# Patient Record
Sex: MALE | Race: ASIAN | Employment: STUDENT | URBAN - METROPOLITAN AREA
[De-identification: names, ages, dates, MRNs, and addresses within clinical notes are randomized per-mention and may not be internally consistent; named-entity substitution may affect disease eponyms.]

---
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
## 2023-08-07 ENCOUNTER — TELEPHONE (OUTPATIENT)
Dept: INTERNAL MEDICINE CLINIC | Facility: CLINIC | Age: 18
End: 2023-08-07

## 2023-08-07 DIAGNOSIS — E66.09 CLASS 2 OBESITY DUE TO EXCESS CALORIES WITHOUT SERIOUS COMORBIDITY WITH BODY MASS INDEX (BMI) OF 36.0 TO 36.9 IN ADULT: Primary | ICD-10-CM

## 2023-08-07 NOTE — TELEPHONE ENCOUNTER
Insurance: [employer] ihp hmo   Provider's Name?: aguila erickson   Provider's Specialty?: internal medicine weight loss management    Reason for Visit?: consult  Diagnosis?: obsesity   Number of Visits Requested?: 3    Last Visit with Specialist?: n/a  Is Appt.  Already Scheduled?: n/a        If so, Date?:  n/a  Once referral is approved pt can see referral via [employer] [facility]

## 2023-08-14 ENCOUNTER — OFFICE VISIT (OUTPATIENT)
Dept: INTERNAL MEDICINE CLINIC | Facility: CLINIC | Age: 18
End: 2023-08-14
Payer: COMMERCIAL

## 2023-08-14 VITALS
DIASTOLIC BLOOD PRESSURE: 88 MMHG | HEART RATE: 64 BPM | HEIGHT: 73.5 IN | WEIGHT: 290.88 LBS | TEMPERATURE: 97 F | SYSTOLIC BLOOD PRESSURE: 128 MMHG | BODY MASS INDEX: 37.73 KG/M2

## 2023-08-14 DIAGNOSIS — Z00.00 PHYSICAL EXAM, ANNUAL: Primary | ICD-10-CM

## 2023-08-14 PROCEDURE — 3074F SYST BP LT 130 MM HG: CPT | Performed by: INTERNAL MEDICINE

## 2023-08-14 PROCEDURE — 3079F DIAST BP 80-89 MM HG: CPT | Performed by: INTERNAL MEDICINE

## 2023-08-14 PROCEDURE — 99395 PREV VISIT EST AGE 18-39: CPT | Performed by: INTERNAL MEDICINE

## 2023-08-14 PROCEDURE — 3008F BODY MASS INDEX DOCD: CPT | Performed by: INTERNAL MEDICINE

## 2023-08-18 ENCOUNTER — LAB ENCOUNTER (OUTPATIENT)
Dept: LAB | Age: 18
End: 2023-08-18
Attending: INTERNAL MEDICINE
Payer: COMMERCIAL

## 2023-08-18 DIAGNOSIS — Z00.00 PHYSICAL EXAM, ANNUAL: ICD-10-CM

## 2023-08-18 LAB
ALBUMIN SERPL-MCNC: 4.3 G/DL (ref 3.4–5)
ALBUMIN/GLOB SERPL: 1.2 {RATIO} (ref 1–2)
ALP LIVER SERPL-CCNC: 66 U/L
ALT SERPL-CCNC: 130 U/L
ANION GAP SERPL CALC-SCNC: 5 MMOL/L (ref 0–18)
AST SERPL-CCNC: 36 U/L (ref 15–37)
BASOPHILS # BLD AUTO: 0.06 X10(3) UL (ref 0–0.2)
BASOPHILS NFR BLD AUTO: 0.6 %
BILIRUB SERPL-MCNC: 0.8 MG/DL (ref 0.1–2)
BUN BLD-MCNC: 16 MG/DL (ref 7–18)
CALCIUM BLD-MCNC: 9.5 MG/DL (ref 8.5–10.1)
CHLORIDE SERPL-SCNC: 106 MMOL/L (ref 98–112)
CHOLEST SERPL-MCNC: 195 MG/DL (ref ?–200)
CO2 SERPL-SCNC: 25 MMOL/L (ref 21–32)
CREAT BLD-MCNC: 1.1 MG/DL
EGFRCR SERPLBLD CKD-EPI 2021: 99 ML/MIN/1.73M2 (ref 60–?)
EOSINOPHIL # BLD AUTO: 0.25 X10(3) UL (ref 0–0.7)
EOSINOPHIL NFR BLD AUTO: 2.3 %
ERYTHROCYTE [DISTWIDTH] IN BLOOD BY AUTOMATED COUNT: 12.7 %
FASTING PATIENT LIPID ANSWER: YES
FASTING STATUS PATIENT QL REPORTED: YES
GLOBULIN PLAS-MCNC: 3.5 G/DL (ref 2.8–4.4)
GLUCOSE BLD-MCNC: 97 MG/DL (ref 70–99)
HCT VFR BLD AUTO: 49.5 %
HDLC SERPL-MCNC: 39 MG/DL (ref 40–59)
HGB BLD-MCNC: 16.8 G/DL
IMM GRANULOCYTES # BLD AUTO: 0.03 X10(3) UL (ref 0–1)
IMM GRANULOCYTES NFR BLD: 0.3 %
LDLC SERPL CALC-MCNC: 104 MG/DL (ref ?–100)
LYMPHOCYTES # BLD AUTO: 3.01 X10(3) UL (ref 1–4)
LYMPHOCYTES NFR BLD AUTO: 27.8 %
MCH RBC QN AUTO: 27.5 PG (ref 26–34)
MCHC RBC AUTO-ENTMCNC: 33.9 G/DL (ref 31–37)
MCV RBC AUTO: 81 FL
MONOCYTES # BLD AUTO: 0.86 X10(3) UL (ref 0.1–1)
MONOCYTES NFR BLD AUTO: 7.9 %
NEUTROPHILS # BLD AUTO: 6.62 X10 (3) UL (ref 1.5–7.7)
NEUTROPHILS # BLD AUTO: 6.62 X10(3) UL (ref 1.5–7.7)
NEUTROPHILS NFR BLD AUTO: 61.1 %
NONHDLC SERPL-MCNC: 156 MG/DL (ref ?–130)
OSMOLALITY SERPL CALC.SUM OF ELEC: 283 MOSM/KG (ref 275–295)
PLATELET # BLD AUTO: 274 10(3)UL (ref 150–450)
POTASSIUM SERPL-SCNC: 3.8 MMOL/L (ref 3.5–5.1)
PROT SERPL-MCNC: 7.8 G/DL (ref 6.4–8.2)
RBC # BLD AUTO: 6.11 X10(6)UL
SODIUM SERPL-SCNC: 136 MMOL/L (ref 136–145)
TRIGL SERPL-MCNC: 303 MG/DL (ref 30–149)
TSI SER-ACNC: 3.53 MIU/ML (ref 0.36–3.74)
VLDLC SERPL CALC-MCNC: 52 MG/DL (ref 0–30)
WBC # BLD AUTO: 10.8 X10(3) UL (ref 4–11)

## 2023-08-18 PROCEDURE — 84443 ASSAY THYROID STIM HORMONE: CPT

## 2023-08-18 PROCEDURE — 80061 LIPID PANEL: CPT

## 2023-08-18 PROCEDURE — 36415 COLL VENOUS BLD VENIPUNCTURE: CPT

## 2023-08-18 PROCEDURE — 80053 COMPREHEN METABOLIC PANEL: CPT

## 2023-08-18 PROCEDURE — 85025 COMPLETE CBC W/AUTO DIFF WBC: CPT

## 2024-05-08 ENCOUNTER — TELEPHONE (OUTPATIENT)
Dept: INTERNAL MEDICINE CLINIC | Facility: CLINIC | Age: 19
End: 2024-05-08

## 2024-05-08 DIAGNOSIS — R73.03 PREDIABETES: ICD-10-CM

## 2024-05-08 DIAGNOSIS — E66.09 CLASS 2 OBESITY DUE TO EXCESS CALORIES WITHOUT SERIOUS COMORBIDITY WITH BODY MASS INDEX (BMI) OF 36.0 TO 36.9 IN ADULT: Primary | ICD-10-CM

## 2024-05-08 NOTE — TELEPHONE ENCOUNTER
[facility]  Provider's Name?: Dr Chawla   Provider's Specialty?: Diabetes    Reason for Visit?: Diabetes   Diagnosis?: N/A   Number of Visits Requested?: 6    Last Visit with Specialist?: 3/13/24   Is Appt. Already Scheduled?: Yes        If so, Date?:  5/15/24  Once referral is approved pt can see referral via [employer]

## 2024-05-08 NOTE — TELEPHONE ENCOUNTER
This provider is techically an internal medicine provider even though he is only managing pt's obesity/prediabetes. Cannot refer to another internal medicine provider. Cannot place an external weight loss referral, only internal available. Only option is to place an external endocrine referral and see if this can be processed. Referral placed, will monitor for auth.

## 2024-05-13 NOTE — TELEPHONE ENCOUNTER
Spoke to patient. Patient wants to go to weight loss clinic as exercising and not technically T2DM. Referral placed. Mcm sent with info per patient request.

## 2024-05-13 NOTE — TELEPHONE ENCOUNTER
See LIYAH Vazquez previous note below.    Per referrals dept:    \"Mary Conner GABRIELA  [facility] Clinical Staff  Hello  The referred to provider Dr. Elise Chawla, is not in network [facility].    This request has been closed.  Please see listed below in network Endocrinologists.  A new referral for any of these providers can be placed.  Thank you  Mary    Name:  DYLAN DE LA ROSA MD  Office:  [facility]  ENDOCRINOLOGY  [address]  [address]  [place], [place] [id number]  Phone:  [phone]  Fax:  [phone]    Name:  ALICJA MICHEL MD  Office:  [facility]  ENDOCRINOLOGY  [address] [place] [address]  Phone:  [phone]  Fax:  [phone]    Name:  QAMAR VALDEZ MD  Office:  [facility]  ENDOCRINOLOGY  [address] [place] [id number]  Phone:  [phone]  Fax:  [phone]    Name:  FRANCE BARKER MD  Office:  [facility]  ENDOCRINOLOGY  [address]  [place], [place] [address]  Phone:  [phone]  Fax:  [phone]    Name:  FRANCE BARKER MD  Office:  [facility]  ENDOCRINOLOGY  [address]  [place], [place] [address]  Phone:  [phone]  Fax:  [phone]    Name:  ESTEVAN SUNSHINE MD  Office:  [facility]  ENDOCRINOLOGY  [address] [place] [address]  Phone:  [phone]  Fax:  [phone]    Name:  JORGE LAYTON MD  Office:  [facility]  ENDOCRINOLOGY  [address] [place] [id number]  Phone:  [phone]  Fax:  [phone]    Name:  DYLAN DE LA ROSA MD  Office:  [facility]  ENDOCRINOLOGY  [address]  [address]  [place], [place] [id number]  Phone:  [phone]  Fax:  [phone]    Name:  JORGE LAYTON MD  Office:  [facility]  ENDOCRINOLOGY  [address]  [address]  [place], [place]  [id number]  Phone:  [phone]  Fax:  [phone]    Name:  QAMAR VALDEZ MD  Office:  [facility]  ENDOCRINOLOGY  [address]  [address]  [place] [address]  Phone:  [phone]  Fax:  [phone]    Name:  QAMAR VALDEZ MD  Office:  [facility]  ENDOCRINOLOGY  [address]  [address]  [place] [address]  Phone:  [phone]  Fax:  [phone]    Name:  LYLA DELVALLE MD  Office:  [facility]  ENDOCRINOLOGY  [address]  [place] [address]  Phone:  [phone]  Fax:  [phone]    Name:  KATARINA RAMOS DO  Office:  [facility]  ENDOCRINOLOGY  [address]  [address]  [place], [place] [id number]  Phone:  [phone]  Fax:  [phone]    Name:  ALICJA MICHEL MD  Office:  [facility]  ENDOCRINOLOGY  [address]  [address]  [place], [place] [address]  Phone:  [phone]  Fax:  [phone]    Name:  PRISCILA SMART DO  Office:  [facility]  ENDOCRINOLOGY  [address]  [address]  [place], [place] [id number]  Phone:  [phone]  Fax:  [phone]    .Name:  BEVERLY MIDDLETON MD  Office:  [facility]  ENDOCRINOLOGY  [address]  [address]  [place] [address]  Phone:  [phone]  Fax:  [phone]    Please advise- thanks!

## 2024-05-13 NOTE — TELEPHONE ENCOUNTER
Pt is ihp so may not be approved for this. Sounds like he sees them for weight loss. We can either refer him to our weight loss clinic here or to endocrine here, he can see dr. White or dr. Menard.   Please check with pt and redo the referral.

## 2024-06-14 ENCOUNTER — OFFICE VISIT (OUTPATIENT)
Dept: FAMILY MEDICINE CLINIC | Facility: CLINIC | Age: 19
End: 2024-06-14
Payer: COMMERCIAL

## 2024-06-14 VITALS
SYSTOLIC BLOOD PRESSURE: 130 MMHG | OXYGEN SATURATION: 97 % | TEMPERATURE: 98 F | RESPIRATION RATE: 16 BRPM | HEART RATE: 89 BPM | WEIGHT: 193 LBS | DIASTOLIC BLOOD PRESSURE: 68 MMHG | BODY MASS INDEX: 25 KG/M2

## 2024-06-14 DIAGNOSIS — H60.502 ACUTE OTITIS EXTERNA OF LEFT EAR, UNSPECIFIED TYPE: Primary | ICD-10-CM

## 2024-06-14 PROCEDURE — 3078F DIAST BP <80 MM HG: CPT | Performed by: NURSE PRACTITIONER

## 2024-06-14 PROCEDURE — 3075F SYST BP GE 130 - 139MM HG: CPT | Performed by: NURSE PRACTITIONER

## 2024-06-14 PROCEDURE — 99213 OFFICE O/P EST LOW 20 MIN: CPT | Performed by: NURSE PRACTITIONER

## 2024-06-14 RX ORDER — OFLOXACIN 3 MG/ML
10 SOLUTION AURICULAR (OTIC) DAILY
Qty: 5 ML | Refills: 0 | Status: SHIPPED | OUTPATIENT
Start: 2024-06-14 | End: 2024-06-21

## 2024-06-14 NOTE — PROGRESS NOTES
CHIEF COMPLAINT:     Chief Complaint   Patient presents with    Ear Problem     ear infection, hair strand removed from ear few days ago. Ear continues to swole. - Entered by patient Left ear x 3 days balance is off       HPI:   Dexter Yoder is a 18 year old male who presents to clinic today with complaints of left ear pain. Has had for 3 days. Reports ear swollen. Removed hair and ear wax with tool. Home treatment includes none.   Denies ear drainage. Patient denies use of Q-tips to clean the ears.     Current Outpatient Medications   Medication Sig Dispense Refill    ofloxacin 0.3 % Otic Solution Place 10 drops into the left ear daily for 7 days. 5 mL 0    metFORMIN  MG Oral Tablet 24 Hr Take 1 tablet (500 mg total) by mouth daily with breakfast.        Past Medical History:    Boxer's fracture      Social History:  Social History     Socioeconomic History    Marital status: Single   Tobacco Use    Smoking status: Never    Smokeless tobacco: Never   Vaping Use    Vaping status: Never Used   Substance and Sexual Activity    Alcohol use: Never    Drug use: Never    Sexual activity: Never        REVIEW OF SYSTEMS:   GENERAL: Feeling well otherwise.    SKIN: no unusual skin lesions or rashes  HEENT: See HPI.  Denies tinnitus or sinus congestion.  LUNGS: No cough, shortness of breath, or wheezing.  CARDIOVASCULAR: No chest pain, palpitations  GI: No N/V/C/D.  NEURO: denies headaches or dizziness    EXAM:   /68   Pulse 89   Temp 97.9 °F (36.6 °C) (Oral)   Resp 16   Wt 193 lb (87.5 kg)   SpO2 97%   BMI 25.12 kg/m²   GENERAL: well developed, well nourished,in no apparent distress  SKIN: no rashes,no suspicious lesions  HEAD: atraumatic, normocephalic  EYES: conjunctiva clear, EOM intact  EARS: Bilateral hearing is intact.  no tragus tender on palpation; left external auditory canal with erythema, no drainage, and mild swelling.  right external auditory canal healthy. Bilateral TMs: clear gray, no  bulging, no retraction, no effusion, bony landmarks visualized.  No  mastoid tenderness bilaterally.   NOSE: nostrils patent, nasal mucosa pink and noninflamed  THROAT: oral mucosa pink, moist. Posterior pharynx is not erythematous or injected. No exudates.  NECK: supple  LUNGS: clear to auscultation bilaterally, no wheezes or rhonchi. Breathing is non labored.  CARDIO: RRR without murmur  EXTREMITIES: no cyanosis, clubbing or edema  LYMPH: no tender auricular or cervical lymphadenopathy.      ASSESSMENT AND PLAN:   Dexter Yoder is a 18 year old male who presents with:    ASSESSMENT:  Encounter Diagnosis   Name Primary?    Acute otitis externa of left ear, unspecified type Yes     1. Acute otitis externa of left ear, unspecified type  - ofloxacin 0.3 % Otic Solution; Place 10 drops into the left ear daily for 7 days.  Dispense: 5 mL; Refill: 0    PLAN: Meds and instructions as listed below.    Keep ear dry for a week. Comfort measures as described in Patient Instructions  Risks, benefits, and side effects of medication explained and discussed.  Stressed importance of completing full course of antibiotic ear drops.     Tylenol/Motrin prn pain.    F/u with PCP in a week or sooner if symptoms worsen    Meds & Refills for this Visit:  Requested Prescriptions     Signed Prescriptions Disp Refills    ofloxacin 0.3 % Otic Solution 5 mL 0     Sig: Place 10 drops into the left ear daily for 7 days.     See pt handout      Patient voiced understand and is in agreement with treatment plan.

## 2024-06-21 ENCOUNTER — OFFICE VISIT (OUTPATIENT)
Dept: INTERNAL MEDICINE CLINIC | Facility: CLINIC | Age: 19
End: 2024-06-21

## 2024-06-21 VITALS
RESPIRATION RATE: 12 BRPM | HEIGHT: 73.5 IN | WEIGHT: 291.31 LBS | TEMPERATURE: 97 F | BODY MASS INDEX: 37.79 KG/M2 | SYSTOLIC BLOOD PRESSURE: 122 MMHG | DIASTOLIC BLOOD PRESSURE: 78 MMHG | HEART RATE: 64 BPM

## 2024-06-21 DIAGNOSIS — Z00.00 PHYSICAL EXAM, ANNUAL: ICD-10-CM

## 2024-06-21 DIAGNOSIS — E66.09 CLASS 2 OBESITY DUE TO EXCESS CALORIES WITHOUT SERIOUS COMORBIDITY WITH BODY MASS INDEX (BMI) OF 36.0 TO 36.9 IN ADULT: ICD-10-CM

## 2024-06-21 DIAGNOSIS — L20.82 FLEXURAL ECZEMA: ICD-10-CM

## 2024-06-21 DIAGNOSIS — Z01.84 IMMUNITY STATUS TESTING: ICD-10-CM

## 2024-06-21 DIAGNOSIS — H91.92 HEARING DIFFICULTY OF LEFT EAR: ICD-10-CM

## 2024-06-21 RX ORDER — METHYLPREDNISOLONE 4 MG/1
TABLET ORAL
Qty: 1 EACH | Refills: 0 | Status: SHIPPED | OUTPATIENT
Start: 2024-06-21

## 2024-06-21 RX ORDER — TRIAMCINOLONE ACETONIDE 1 MG/G
CREAM TOPICAL 2 TIMES DAILY PRN
Qty: 60 G | Refills: 0 | Status: SHIPPED | OUTPATIENT
Start: 2024-06-21

## 2024-06-21 RX ORDER — METFORMIN HYDROCHLORIDE 500 MG/1
500 TABLET, EXTENDED RELEASE ORAL 2 TIMES DAILY WITH MEALS
Qty: 180 TABLET | Refills: 1 | Status: SHIPPED | OUTPATIENT
Start: 2024-06-21

## 2024-06-21 NOTE — PROGRESS NOTES
[facility]    CHIEF COMPLAINT:    Chief Complaint   Patient presents with    Derm Problem     Rash on right lower and upper arm. X 1 month         HISTORY OF PRESENT ILLNESS:  here with several concerns.   Has a rash on both arms on the inside of his elbows and in the axillary area. Itchy. Has had it for over a month. No pain, no drainage. He has been scratching.   Has some around his mouth as well but not as itchy.   He does like to take long hot showers.     He was in the urgent care last week for left ear symptoms. Had decreased and muffled hearing. He was diagnosed with a left otitis externa and was given antibiotic ear drops. His symptoms seem to be coming back.   He feels he has wax in his ears. Uses a curette at times to clean it out. No pain in the ear.     Obesity: he was seeing a weight loss clinic and saw them in March but now needs a referral to an in network weight loss clinic. Currently on metformin.   He has elevated insulin and c peptide levels. Tsh was normal.   Exercises regularly. Goes to a gym.   Has been trying to watch the carbs in his diet.     Mother is a hep b carrier. Wants to get tested for hep b immunity. He had vaccines as a baby.     Will be due for cpx in a month or two.     REVIEW OF SYSTEMS:  See HPI    Current Medications:    Current Outpatient Medications   Medication Sig Dispense Refill    metFORMIN  MG Oral Tablet 24 Hr Take 1 tablet (500 mg total) by mouth 2 (two) times daily with meals. 180 tablet 1    methylPREDNISolone (MEDROL) 4 MG Oral Tablet Therapy Pack As directed. 1 each 0    triamcinolone 0.1 % External Cream Apply topically 2 (two) times daily as needed. 60 g 0    ofloxacin 0.3 % Otic Solution Place 10 drops into the left ear daily for 7 days. 5 mL 0       PAST MEDICAL, SOCIAL, AND FAMILY HISTORY:  Tobacco:    History   Smoking Status    Never   Smokeless Tobacco    Never       PHYSICAL EXAM:  /78 (BP Location: Left arm, Patient Position:  Sitting, Cuff Size: large)   Pulse 64   Temp 97.4 °F (36.3 °C) (Temporal)   Resp 12   Ht 6' 1.5\" (1.867 m)   Wt 291 lb 4.8 oz (132.1 kg)   BMI 37.91 kg/m²    GENERAL: well developed, well nourished,in no apparent distress  SKIN: patchy eczematous rash on his antecubital fossa, axillary fold and some around his mouth. No signs of infection.   HEENT: TMs normal bilaterally. Left ear canal with flaky dry skin, no signs of infection today.     DATA:  Results for orders placed or performed in visit on 08/18/23   Comp Metabolic Panel   Result Value Ref Range    Glucose 97 70 - 99 mg/dL    Sodium 136 136 - 145 mmol/L    Potassium 3.8 3.5 - 5.1 mmol/L    Chloride 106 98 - 112 mmol/L    CO2 25.0 21.0 - 32.0 mmol/L    Anion Gap 5 0 - 18 mmol/L    BUN 16 7 - 18 mg/dL    Creatinine 1.10 Male 0.70-1.30 : Female 0.55-1.02 mg/dL    Calcium, Total 9.5 8.5 - 10.1 mg/dL    Calculated Osmolality 283 275 - 295 mOsm/kg    eGFR-Cr 99 >=60 mL/min/1.73m2    AST 36 15 - 37 U/L     Male 16-61 : Female 13-56 U/L    Alkaline Phosphatase 66 Male  : Female  U/L    Bilirubin, Total 0.8 0.1 - 2.0 mg/dL    Total Protein 7.8 6.4 - 8.2 g/dL    Albumin 4.3 3.4 - 5.0 g/dL    Globulin  3.5 2.8 - 4.4 g/dL    A/G Ratio 1.2 1.0 - 2.0    Patient Fasting for CMP? Yes    Lipid Panel   Result Value Ref Range    Cholesterol, Total 195 <200 mg/dL    HDL Cholesterol 39 (L) 40 - 59 mg/dL    Triglycerides 303 (H) 30 - 149 mg/dL    LDL Cholesterol 104 (H) <100 mg/dL    VLDL 52 (H) 0 - 30 mg/dL    Non HDL Chol 156 (H) <130 mg/dL    Patient Fasting for Lipid? Yes    TSH W Reflex To Free T4   Result Value Ref Range    TSH 3.530 0.358 - 3.740 mIU/mL   CBC W/ DIFFERENTIAL   Result Value Ref Range    WBC 10.8 4.0 - 11.0 x10(3) uL    RBC 6.11 Male 4.30-5.70 : Female 3.80-5.30 x10(6)uL    HGB 16.8 Male 13.0-17.5 : Female 12.0-16.0 g/dL    HCT 49.5 Male 39.0-53.0 : Female 35.0-48.0 %    .0 150.0 - 450.0 10(3)uL    MCV 81.0 Male 80.0-100.0 :  Female 80.0-100.0 fL    MCH 27.5 26.0 - 34.0 pg    MCHC 33.9 31.0 - 37.0 g/dL    RDW 12.7 %    Neutrophil Absolute Prelim 6.62 1.50 - 7.70 x10 (3) uL    Neutrophil Absolute 6.62 1.50 - 7.70 x10(3) uL    Lymphocyte Absolute 3.01 1.00 - 4.00 x10(3) uL    Monocyte Absolute 0.86 0.10 - 1.00 x10(3) uL    Eosinophil Absolute 0.25 0.00 - 0.70 x10(3) uL    Basophil Absolute 0.06 0.00 - 0.20 x10(3) uL    Immature Granulocyte Absolute 0.03 0.00 - 1.00 x10(3) uL    Neutrophil % 61.1 %    Lymphocyte % 27.8 %    Monocyte % 7.9 %    Eosinophil % 2.3 %    Basophil % 0.6 %    Immature Granulocyte % 0.3 %            ASSESSMENT AND PLAN:  1. Flexural eczema  - methylPREDNISolone (MEDROL) 4 MG Oral Tablet Therapy Pack; As directed.  Dispense: 1 each; Refill: 0  - triamcinolone 0.1 % External Cream; Apply topically 2 (two) times daily as needed.  Dispense: 60 g; Refill: 0  Derm referral if no relief.   Counseling today re conservative management of eczema. Regular moisturization, avoidance of long hot showers.     2. Hearing difficulty of left ear  No ear infection today.   May be sec to etd but no mucus behind the tm today.   Trial claritin 10mg daily for 7-10 days to see if he has relief.   Counseling today re avoidance of curette or q tips in his ears. Okay to use gentle qtip on the outside if has itching but avoid excessive scratching.   See ent. May need steroid drops for his ears if persists.   - ENT - INTERNAL    3. Class 2 obesity due to excess calories without serious comorbidity with body mass index (BMI) of 36.0 to 36.9 in adult  Continue metformin. Increase it to 500mg bid.   See the weight loss clinic here, should be in network.   Has an appt in December. He will get on the wait list to be seen sooner. Is in college so will go away after the summer and will only be back in December.   Discussed diet, exercise.  - metFORMIN  MG Oral Tablet 24 Hr; Take 1 tablet (500 mg total) by mouth 2 (two) times daily with meals.   Dispense: 180 tablet; Refill: 1  - [facility] - Richa Mercado APRN [address] [place] [address], [address]    4. Immunity status testing  Will check hep b immunity.   - Hepatitis B Surface Antibody [E]; Future    5. Physical exam, annual  Labs prior to cpx.   - CBC With Differential With Platelet; Future  - Comp Metabolic Panel; Future  - Lipid Panel; Future        Return in about 6 years (around 6/21/2030) for physical.    50 minutes spent on provision of medical services today, including chart review, obtaining and reviewing history, performing medically appropriate examination, counseling and educating patient and/or caregiver, ordering testing , medications, referrals or procedures, my independent interpretation of results, communication of results to patient and /or caregiver, care coordination, and documentation of all of the above.       Justina Suarez MD

## 2024-08-16 ENCOUNTER — OFFICE VISIT (OUTPATIENT)
Dept: INTERNAL MEDICINE CLINIC | Facility: CLINIC | Age: 19
End: 2024-08-16
Payer: COMMERCIAL

## 2024-08-16 VITALS
TEMPERATURE: 97 F | WEIGHT: 285.19 LBS | HEIGHT: 73.5 IN | BODY MASS INDEX: 36.99 KG/M2 | RESPIRATION RATE: 12 BRPM | SYSTOLIC BLOOD PRESSURE: 116 MMHG | HEART RATE: 64 BPM | DIASTOLIC BLOOD PRESSURE: 74 MMHG

## 2024-08-16 DIAGNOSIS — Z00.00 PHYSICAL EXAM, ANNUAL: Primary | ICD-10-CM

## 2024-08-16 DIAGNOSIS — E66.09 CLASS 2 OBESITY DUE TO EXCESS CALORIES WITHOUT SERIOUS COMORBIDITY WITH BODY MASS INDEX (BMI) OF 36.0 TO 36.9 IN ADULT: ICD-10-CM

## 2024-08-16 DIAGNOSIS — Z23 NEED FOR VACCINATION: ICD-10-CM

## 2024-08-16 PROCEDURE — 90471 IMMUNIZATION ADMIN: CPT | Performed by: INTERNAL MEDICINE

## 2024-08-16 PROCEDURE — 3074F SYST BP LT 130 MM HG: CPT | Performed by: INTERNAL MEDICINE

## 2024-08-16 PROCEDURE — 90715 TDAP VACCINE 7 YRS/> IM: CPT | Performed by: INTERNAL MEDICINE

## 2024-08-16 PROCEDURE — 99395 PREV VISIT EST AGE 18-39: CPT | Performed by: INTERNAL MEDICINE

## 2024-08-16 PROCEDURE — 3078F DIAST BP <80 MM HG: CPT | Performed by: INTERNAL MEDICINE

## 2024-08-16 PROCEDURE — 99213 OFFICE O/P EST LOW 20 MIN: CPT | Performed by: INTERNAL MEDICINE

## 2024-08-16 PROCEDURE — 3008F BODY MASS INDEX DOCD: CPT | Performed by: INTERNAL MEDICINE

## 2024-08-16 RX ORDER — METFORMIN HYDROCHLORIDE 500 MG/1
1000 TABLET, EXTENDED RELEASE ORAL 2 TIMES DAILY WITH MEALS
Qty: 360 TABLET | Refills: 1 | Status: SHIPPED | OUTPATIENT
Start: 2024-08-16

## 2024-08-16 NOTE — PROGRESS NOTES
[facility]    CHIEF COMPLAINT:   Chief Complaint   Patient presents with    Routine Physical          HPI:   Dexter Yoder is a 19 year old male who presents for a complete physical exam.    Has not done labs yet.     Exercising regularly now. Has lost 6 lbs since last visit.   Has an appt with the [facility] in 12/2024.     Taking metformin. Tolerating well.     Eczema has resolved.     Tdap due today.     Goes to [facility].     Not sexually active, never has been.   Uses seat belts when driving.   No penile lesions, ulcers, discharge or bulges.         Wt Readings from Last 6 Encounters:   08/16/24 285 lb 3.2 oz (129.4 kg)   06/21/24 291 lb 4.8 oz (132.1 kg)   06/14/24 193 lb (87.5 kg)   08/14/23 290 lb 14.4 oz (132 kg)   08/12/22 280 lb 3.2 oz (127.1 kg) (>99%, Z= 2.83)*   03/16/22 270 lb 6.4 oz (122.7 kg) (>99%, Z= 2.71)*     * Growth percentiles are based on CDC (Boys, 2-20 Years) data.     Body mass index is 37.12 kg/m².       Current Outpatient Medications   Medication Sig Dispense Refill    metFORMIN  MG Oral Tablet 24 Hr Take 2 tablets (1,000 mg total) by mouth 2 (two) times daily with meals. 360 tablet 1    triamcinolone 0.1 % External Cream Apply topically 2 (two) times daily as needed. 60 g 0      Past Medical History:    Boxer's fracture      Past Surgical History:   Procedure Laterality Date    Other surgical history      wisdom teeth x 2      Family History   Problem Relation Age of Onset    Hypertension Father     Diabetes Father       Social History:   Social History     Socioeconomic History    Marital status: Single   Tobacco Use    Smoking status: Never    Smokeless tobacco: Never   Vaping Use    Vaping status: Never Used   Substance and Sexual Activity    Alcohol use: Never    Drug use: Never    Sexual activity: Never     Occ: student. : single. Children: no.   Exercise:  exercising regularly .  Diet: watches calories closely     REVIEW OF SYSTEMS:   GENERAL: feels well  otherwise  SKIN: denies any unusual skin lesions  EYES:denies blurred vision or double vision  HEENT: denies nasal congestion, sinus pain or ST  LUNGS: denies shortness of breath with exertion  CARDIOVASCULAR: denies chest pain on exertion  GI: denies abdominal pain,denies heartburn  : denies dysuria  MUSCULOSKELETAL: denies back pain  NEURO: denies headaches  PSYCHE: denies depression or anxiety  HEMATOLOGIC: denies hx of anemia  ENDOCRINE: denies thyroid history  ALL/ASTHMA: denies hx of allergy or asthma  EXAM:   /74 (BP Location: Right arm, Patient Position: Sitting, Cuff Size: large)   Pulse 64   Temp 97.2 °F (36.2 °C) (Temporal)   Resp 12   Ht 6' 1.5\" (1.867 m)   Wt 285 lb 3.2 oz (129.4 kg)   BMI 37.12 kg/m²   Body mass index is 37.12 kg/m².   GENERAL: well developed, well nourished,in no apparent distress  SKIN: no rashes,no suspicious lesions  HEENT: atraumatic, normocephalic,ears and throat are clear  EYES:PERRLA, conjunctiva are clear  NECK: supple,no adenopathy,no bruits  CHEST: no chest tenderness  LUNGS: clear to auscultation  CARDIO: nl s1 and s2, RRR without murmur  GI: good BS's,no masses, HSM or tenderness  GENITAL/URINARY:  deferred. No symptoms.   MUSCULOSKELETAL: back is not tender,FROM of the back  EXTREMITIES: no cyanosis, clubbing or edema  NEURO: Oriented times three,cranial nerves are intact,motor and sensory are grossly intact  Labs:   Lab Results   Component Value Date/Time    WBC 10.8 08/18/2023 07:44 AM    HGB 16.8 08/18/2023 07:44 AM    .0 08/18/2023 07:44 AM      Lab Results   Component Value Date/Time    GLU 97 08/18/2023 07:44 AM     08/18/2023 07:44 AM    K 3.8 08/18/2023 07:44 AM     08/18/2023 07:44 AM    CO2 25.0 08/18/2023 07:44 AM    CREATSERUM 1.10 08/18/2023 07:44 AM    CA 9.5 08/18/2023 07:44 AM    ALB 4.3 08/18/2023 07:44 AM    TP 7.8 08/18/2023 07:44 AM    ALKPHO 66 08/18/2023 07:44 AM    AST 36 08/18/2023 07:44 AM     08/18/2023  07:44 AM    BILT 0.8 08/18/2023 07:44 AM    TSH 3.530 08/18/2023 07:44 AM        Lab Results   Component Value Date/Time    CHOLEST 195 08/18/2023 07:44 AM    HDL 39 (L) 08/18/2023 07:44 AM    TRIG 303 (H) 08/18/2023 07:44 AM     (H) 08/18/2023 07:44 AM    NONHDLC 156 (H) 08/18/2023 07:44 AM       Lab Results   Component Value Date/Time    A1C 5.5 08/12/2022 10:59 AM      Vitamin D:    No results found for: \"VITD\"        ASSESSMENT AND PLAN:   Dexter Yoder is a 19 year old male who presents for a complete physical exam.     1. Physical exam, annual  Do labs.   Continue regular exercise.   Tdap done today.     2. Class 2 obesity due to excess calories without serious comorbidity with body mass index (BMI) of 36.0 to 36.9 in adult  Increase metformin er to 1000mg bid.   - metFORMIN  MG Oral Tablet 24 Hr; Take 2 tablets (1,000 mg total) by mouth 2 (two) times daily with meals.  Dispense: 360 tablet; Refill: 1        Return in about 1 year (around 8/16/2025) for physical.      Justina Suarez MD

## 2024-08-22 ENCOUNTER — LAB ENCOUNTER (OUTPATIENT)
Dept: LAB | Age: 19
End: 2024-08-22
Attending: INTERNAL MEDICINE
Payer: COMMERCIAL

## 2024-08-22 DIAGNOSIS — Z00.00 PHYSICAL EXAM, ANNUAL: ICD-10-CM

## 2024-08-22 DIAGNOSIS — Z01.84 IMMUNITY STATUS TESTING: ICD-10-CM

## 2024-08-22 LAB
ALBUMIN SERPL-MCNC: 5.1 G/DL (ref 3.2–4.8)
ALBUMIN/GLOB SERPL: 2.3 {RATIO} (ref 1–2)
ALP LIVER SERPL-CCNC: 66 U/L
ALT SERPL-CCNC: 63 U/L
ANION GAP SERPL CALC-SCNC: 2 MMOL/L (ref 0–18)
AST SERPL-CCNC: 24 U/L (ref ?–34)
BASOPHILS # BLD AUTO: 0.03 X10(3) UL (ref 0–0.2)
BASOPHILS NFR BLD AUTO: 0.4 %
BILIRUB SERPL-MCNC: 0.6 MG/DL (ref 0.3–1.2)
BUN BLD-MCNC: 13 MG/DL (ref 9–23)
CALCIUM BLD-MCNC: 10.2 MG/DL (ref 8.7–10.4)
CHLORIDE SERPL-SCNC: 106 MMOL/L (ref 98–112)
CHOLEST SERPL-MCNC: 166 MG/DL (ref ?–200)
CO2 SERPL-SCNC: 31 MMOL/L (ref 21–32)
CREAT BLD-MCNC: 1.02 MG/DL
EGFRCR SERPLBLD CKD-EPI 2021: 107 ML/MIN/1.73M2 (ref 60–?)
EOSINOPHIL # BLD AUTO: 0.24 X10(3) UL (ref 0–0.7)
EOSINOPHIL NFR BLD AUTO: 3.4 %
ERYTHROCYTE [DISTWIDTH] IN BLOOD BY AUTOMATED COUNT: 12.3 %
FASTING PATIENT LIPID ANSWER: YES
FASTING STATUS PATIENT QL REPORTED: YES
GLOBULIN PLAS-MCNC: 2.2 G/DL (ref 2–3.5)
GLUCOSE BLD-MCNC: 96 MG/DL (ref 70–99)
HBV SURFACE AB SER QL: NONREACTIVE
HBV SURFACE AB SERPL IA-ACNC: <3.1 MIU/ML
HCT VFR BLD AUTO: 49.2 %
HDLC SERPL-MCNC: 38 MG/DL (ref 40–59)
HGB BLD-MCNC: 16.5 G/DL
IMM GRANULOCYTES # BLD AUTO: 0.02 X10(3) UL (ref 0–1)
IMM GRANULOCYTES NFR BLD: 0.3 %
LDLC SERPL CALC-MCNC: 100 MG/DL (ref ?–100)
LYMPHOCYTES # BLD AUTO: 2.31 X10(3) UL (ref 1–4)
LYMPHOCYTES NFR BLD AUTO: 33.1 %
MCH RBC QN AUTO: 27.9 PG (ref 26–34)
MCHC RBC AUTO-ENTMCNC: 33.5 G/DL (ref 31–37)
MCV RBC AUTO: 83.1 FL
MONOCYTES # BLD AUTO: 0.45 X10(3) UL (ref 0.1–1)
MONOCYTES NFR BLD AUTO: 6.4 %
NEUTROPHILS # BLD AUTO: 3.93 X10 (3) UL (ref 1.5–7.7)
NEUTROPHILS # BLD AUTO: 3.93 X10(3) UL (ref 1.5–7.7)
NEUTROPHILS NFR BLD AUTO: 56.4 %
NONHDLC SERPL-MCNC: 128 MG/DL (ref ?–130)
OSMOLALITY SERPL CALC.SUM OF ELEC: 288 MOSM/KG (ref 275–295)
PLATELET # BLD AUTO: 236 10(3)UL (ref 150–450)
POTASSIUM SERPL-SCNC: 4 MMOL/L (ref 3.5–5.1)
PROT SERPL-MCNC: 7.3 G/DL (ref 5.7–8.2)
RBC # BLD AUTO: 5.92 X10(6)UL
SODIUM SERPL-SCNC: 139 MMOL/L (ref 136–145)
TRIGL SERPL-MCNC: 157 MG/DL (ref 30–149)
VLDLC SERPL CALC-MCNC: 26 MG/DL (ref 0–30)
WBC # BLD AUTO: 7 X10(3) UL (ref 4–11)

## 2024-08-22 PROCEDURE — 80053 COMPREHEN METABOLIC PANEL: CPT

## 2024-08-22 PROCEDURE — 85025 COMPLETE CBC W/AUTO DIFF WBC: CPT

## 2024-08-22 PROCEDURE — 36415 COLL VENOUS BLD VENIPUNCTURE: CPT

## 2024-08-22 PROCEDURE — 80061 LIPID PANEL: CPT

## 2024-08-22 PROCEDURE — 86706 HEP B SURFACE ANTIBODY: CPT

## 2024-08-23 ENCOUNTER — TELEPHONE (OUTPATIENT)
Dept: INTERNAL MEDICINE CLINIC | Facility: CLINIC | Age: 19
End: 2024-08-23

## 2024-08-23 ENCOUNTER — NURSE ONLY (OUTPATIENT)
Dept: INTERNAL MEDICINE CLINIC | Facility: CLINIC | Age: 19
End: 2024-08-23
Payer: COMMERCIAL

## 2024-08-23 DIAGNOSIS — R79.89 ELEVATED LIVER FUNCTION TESTS: ICD-10-CM

## 2024-08-23 DIAGNOSIS — O98.419 MATERNAL HBSAG (HEPATITIS B SURFACE ANTIGEN) CARRIER (HCC): Primary | ICD-10-CM

## 2024-08-23 DIAGNOSIS — B18.1 MATERNAL HBSAG (HEPATITIS B SURFACE ANTIGEN) CARRIER (HCC): Primary | ICD-10-CM

## 2024-08-23 DIAGNOSIS — Z01.84 IMMUNITY STATUS TESTING: ICD-10-CM

## 2024-08-23 PROCEDURE — 90746 HEPB VACCINE 3 DOSE ADULT IM: CPT | Performed by: INTERNAL MEDICINE

## 2024-08-23 PROCEDURE — 90471 IMMUNIZATION ADMIN: CPT | Performed by: INTERNAL MEDICINE

## 2024-08-23 NOTE — TELEPHONE ENCOUNTER
Pt received Hep B booster today, he is going back to school and wont be back until Thanksgiving break to repeat labs to check immunity, is this okay? Thanks!

## 2024-08-23 NOTE — TELEPHONE ENCOUNTER
From result note: Hep b surface antibody negative. He does not have immunity to hep b any more. He should get a hep b booster and recheck in 1 month after that.       Order placed per pt protocol.

## 2024-08-23 NOTE — TELEPHONE ENCOUNTER
Noted thanks, I advised the patient that this would be okay and we would notify him if otherwise.

## 2024-11-25 ENCOUNTER — LAB ENCOUNTER (OUTPATIENT)
Dept: LAB | Age: 19
End: 2024-11-25
Attending: INTERNAL MEDICINE
Payer: COMMERCIAL

## 2024-11-25 DIAGNOSIS — B18.1 MATERNAL HBSAG (HEPATITIS B SURFACE ANTIGEN) CARRIER (HCC): ICD-10-CM

## 2024-11-25 DIAGNOSIS — R74.01 ELEVATED ALT MEASUREMENT: Primary | ICD-10-CM

## 2024-11-25 DIAGNOSIS — R79.89 ELEVATED LIVER FUNCTION TESTS: ICD-10-CM

## 2024-11-25 DIAGNOSIS — O98.419 MATERNAL HBSAG (HEPATITIS B SURFACE ANTIGEN) CARRIER (HCC): ICD-10-CM

## 2024-11-25 DIAGNOSIS — Z01.84 IMMUNITY STATUS TESTING: ICD-10-CM

## 2024-11-25 DIAGNOSIS — R77.0 ABNORMAL ALBUMIN: ICD-10-CM

## 2024-11-25 LAB
ALBUMIN SERPL-MCNC: 4.9 G/DL (ref 3.2–4.8)
ALP LIVER SERPL-CCNC: 69 U/L
ALT SERPL-CCNC: 70 U/L
AST SERPL-CCNC: 27 U/L (ref ?–34)
BILIRUB DIRECT SERPL-MCNC: 0.2 MG/DL (ref ?–0.3)
BILIRUB SERPL-MCNC: 0.9 MG/DL (ref 0.3–1.2)
HBV SURFACE AB SER QL: REACTIVE
HBV SURFACE AB SERPL IA-ACNC: 172.37 MIU/ML
PROT SERPL-MCNC: 7.2 G/DL (ref 5.7–8.2)

## 2024-11-25 PROCEDURE — 36415 COLL VENOUS BLD VENIPUNCTURE: CPT

## 2024-11-25 PROCEDURE — 80076 HEPATIC FUNCTION PANEL: CPT

## 2024-11-25 PROCEDURE — 86706 HEP B SURFACE ANTIBODY: CPT

## 2024-11-26 ENCOUNTER — APPOINTMENT (OUTPATIENT)
Dept: GENERAL RADIOLOGY | Facility: HOSPITAL | Age: 19
End: 2024-11-26
Attending: EMERGENCY MEDICINE
Payer: COMMERCIAL

## 2024-11-26 ENCOUNTER — HOSPITAL ENCOUNTER (EMERGENCY)
Facility: HOSPITAL | Age: 19
Discharge: HOME | End: 2024-11-26
Attending: EMERGENCY MEDICINE
Payer: COMMERCIAL

## 2024-11-26 VITALS
RESPIRATION RATE: 20 BRPM | OXYGEN SATURATION: 96 % | HEIGHT: 73 IN | BODY MASS INDEX: 38.43 KG/M2 | SYSTOLIC BLOOD PRESSURE: 147 MMHG | DIASTOLIC BLOOD PRESSURE: 80 MMHG | WEIGHT: 290 LBS | HEART RATE: 90 BPM | TEMPERATURE: 98 F
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
## 2024-11-26 DIAGNOSIS — T17.228A FISHBONE IN PHARYNX: Primary | ICD-10-CM

## 2024-11-26 PROCEDURE — 70360 X-RAY EXAM OF NECK: CPT | Performed by: EMERGENCY MEDICINE

## 2024-11-26 PROCEDURE — 42809 REMOVE PHARYNX FOREIGN BODY: CPT | Performed by: OTOLARYNGOLOGY

## 2024-11-26 PROCEDURE — 99282 EMERGENCY DEPT VISIT SF MDM: CPT | Performed by: OTOLARYNGOLOGY

## 2024-11-26 NOTE — ED INITIAL ASSESSMENT (HPI)
To the ED with c/o fish bone stuck in throat. No issues managing secretions, no issues breathing or swallowing.

## 2024-11-27 ENCOUNTER — PATIENT OUTREACH (OUTPATIENT)
Dept: CASE MANAGEMENT | Age: 19
End: 2024-11-27

## 2024-11-27 NOTE — PROGRESS NOTES
Patient had recent Emergency Room visit, calling to offer Primary Care Physician follow-up appointment (discharged 11/26)    Dr Justina Suarez  [address] [address]  [phone]  Patient declined appointment; patient going back to school on Monday  Closing encounter

## 2024-11-27 NOTE — CONSULTS
[facility]   part of [facility]    Report of Consultation    Dexter Yoder Patient Status:  Emergency    2005 MRN [id number]   Location [facility] EMERGENCY DEPARTMENT Attending Faisal Sepulveda MD   Hosp Day # 0 PCP Justina Suarez MD     Date of Admission:  2024  Date of Consult:  2024  Reason for Consultation:   Foreign body fishbone in throat    History of Present Illness:   Patient is a 19 year old male who was admitted to the hospital for <principal problem not specified>:  Patient feels like he swallowed a fishbone earlier today.  He complains of pain sensation on left side of his throat.  He has had no fever no shortness of breath.    Past Medical History  Past Medical History:    Boxer's fracture       Past Surgical History  Past Surgical History:   Procedure Laterality Date    Other surgical history      wisdom teeth x 2       Family History  Family History   Problem Relation Age of Onset    Hypertension Father     Diabetes Father        Social History  Pediatric History   Patient Parents    Desmond Miller (Mother)    Tye Yoder (Father)     Other Topics Concern    Caffeine Concern Not Asked    Exercise Not Asked    Seat Belt Not Asked    Special Diet Not Asked    Stress Concern Not Asked    Weight Concern Not Asked   Social History Narrative    Not on file           Current Medications:  No current facility-administered medications for this encounter.     (Not in a hospital admission)      Allergies  Allergies[1]    Review of Systems:   A comprehensive review of systems was negative.    Physical Exam:   Blood pressure 147/80, pulse 90, temperature 98 °F (36.7 °C), temperature source Temporal, resp. rate 20, height 6' 1\" (1.854 m), weight 290 lb (131.5 kg), SpO2 96%.         Constitutional Normal Overall appearance - Normal.   Psychiatric Normal Orientation - Oriented to time, place, person & situation. Appropriate mood and affect.   Head/Face Normal Facial features --  Normal. Skull - Normal.   Eyes Normal Pupils equal ,round ,react to light and accomidate   Ears Normal External Ear Right: Normal, Left: Normal. Canal - Right: Normal, Left: Normal. TM - Right: Normal, Left: Normal.   Nose Normal External Nose, Normal, Septum -Midline, Right, Left Turbinates - Right: Normal, Hypertrophic Left: Normal, Hypertrophic   Mouth/Throat Normal Oropharynx -there was a fishbone stuck in the left tonsil.   Neck Exam Normal Inspection - Normal. Palpation - Normal. Parotid gland - Normal. Thyroid gland - Normal.   Neurological Normal Memory - Normal. Cranial nerves - Cranial nerves II through XII grossly intact.   Nasopharynx Normal  Normal        Skin Normal Inspection - Normal.        Lymph Detail Normal Submental. Submandibular. Anterior cervical. Posterior cervical. Supraclavicular.     Fishbone was seen stuck in the left tonsil.  This was removed under direct visualization using a hemostat without difficulty.  All patient's symptoms resolved    Results:     Laboratory Data:  Lab Results   Component Value Date    WBC 7.0 08/22/2024    HGB 16.5 08/22/2024    HCT 49.2 08/22/2024    .0 08/22/2024    CREATSERUM 1.02 08/22/2024    BUN 13 08/22/2024     08/22/2024    K 4.0 08/22/2024     08/22/2024    CO2 31.0 08/22/2024    GLU 96 08/22/2024    CA 10.2 08/22/2024    ALB 4.9 (H) 11/25/2024    ALKPHO 69 11/25/2024    TP 7.2 11/25/2024    AST 27 11/25/2024    ALT 70 (H) 11/25/2024    TSH 3.530 08/18/2023         Imaging:  XR SOFT TISSUE NECK (CPT=70360)    Result Date: 11/26/2024  PROCEDURE:  XR SOFT TISSUE NECK (CPT=70360)  COMPARISON:  None.  INDICATIONS:  PT STATES FISH BONE STUCK IN THROAT. SWALLOWING SALIVIA, NO QUINN  PATIENT STATED HISTORY: (As transcribed by Technologist)  PT STATES FISH BONE STUCK IN THROAT.   FINDINGS:   ADENOIDS:  Normal for age. OTHER:  No epiglottic thickening, subglottic narrowing, or prevertebral soft tissue swelling. No radiopaque foreign body. Widely  patent airway.            CONCLUSION:  Widely patent airway. No radiopaque foreign body.  No evidence retained radiopaque foreign body.   LOCATION:  [facility]    Dictated by (CST): Rafael Woody MD on 11/26/2024 at 8:57 PM     Finalized by (CST): Rafael Woody MD on 11/26/2024 at 8:57 PM           Impression:   Fishbone foreign body stuck in the left tonsil.  This was removed today without difficulty.    Recommendations:  Patient will see me as needed.    Thank you for allowing me to participate in the care of your patient.    Sidney Gonzalez MD  11/26/2024        [1] No Known Allergies

## 2024-11-27 NOTE — ED PROVIDER NOTES
Patient Seen in: [facility] Emergency Department      History     Chief Complaint   Patient presents with    FB in Throat     Stated Complaint: PT STATES FISH BONE STUCK IN THROAT. SWALLOWING SALIVIA, NO QUINN    Subjective:   HPI      19-year-old male comes to the hospital after swallowing a fishbone at about 6 PM.  He says he feels it stuck in the left side of his throat.  He can still swallow his saliva and has no shortness of breath.  Denies any nausea or vomiting.  He is denying any other specific complaints.    Objective:     Past Medical History:    Boxer's fracture              Past Surgical History:   Procedure Laterality Date    Other surgical history      wisdom teeth x 2                Social History     Socioeconomic History    Marital status: Single   Tobacco Use    Smoking status: Never    Smokeless tobacco: Never   Vaping Use    Vaping status: Never Used   Substance and Sexual Activity    Alcohol use: Never    Drug use: Never    Sexual activity: Never                  Physical Exam     ED Triage Vitals [11/26/24 1757]   /80   Pulse 90   Resp 20   Temp 98 °F (36.7 °C)   Temp src Temporal   SpO2 96 %   O2 Device None (Room air)       Current Vitals:   Vital Signs  BP: 147/80  Pulse: 90  Resp: 20  Temp: 98 °F (36.7 °C)  Temp src: Temporal    Oxygen Therapy  SpO2: 96 %  O2 Device: None (Room air)        Physical Exam  HEENT; NCAT, EOMI, throat clear, neck supple, no LAD, no JVD  Heart S1S2 RRR  lungs: CTAB  abd: Soft NT, ND,  NABS without rebound or guarding  Ext no C/C/E    ED Course   Labs Reviewed - No data to display    ED Course as of 11/26/24 2151  ------------------------------------------------------------  Time: 11/26 2148  Comment: While here the patient had a soft tissue neck film that I interpreted record visualized fishbone.  Read the radiology report as well.  I spoke with ENT Dr. Gonzalez who came to the emergency room and removed the fishbone while here.       XR SOFT TISSUE  NECK (CPT=70360)    Result Date: 11/26/2024  PROCEDURE:  XR SOFT TISSUE NECK (CPT=70360)  COMPARISON:  None.  INDICATIONS:  PT STATES FISH BONE STUCK IN THROAT. SWALLOWING SALIVIA, NO QUINN  PATIENT STATED HISTORY: (As transcribed by Technologist)  PT STATES FISH BONE STUCK IN THROAT.   FINDINGS:   ADENOIDS:  Normal for age. OTHER:  No epiglottic thickening, subglottic narrowing, or prevertebral soft tissue swelling. No radiopaque foreign body. Widely patent airway.            CONCLUSION:  Widely patent airway. No radiopaque foreign body.  No evidence retained radiopaque foreign body.   LOCATION:  [facility]    Dictated by (CST): Rafael Woody MD on 11/26/2024 at 8:57 PM     Finalized by (CST): Rafael Woody MD on 11/26/2024 at 8:57 PM             MDM      Differential diagnosis included retained foreign body above or below the vocal cords but not limited such.  At this time ENT came and found the fishbone behind the left tonsil which he removed at this time.  The patient is feeling better at this time.    Patient was screened and evaluated during this visit.   As a treating physician attending to the patient, I determined, within reasonable clinical confidence and prior to discharge, that an emergency medical condition was not or was no longer present.  There was no indication for further evaluation, treatment or admission on an emergency basis.       The usual and customary discharge instuctions were discussed given the patient's ER course.  We discussed signs and symptoms that should prompt the patient's immediate return to the emergency department.   Reasonable over the counter and prescription treatment options and Physician follow up plan was discussed.       The patient is discharged in good condition.     This note was prepared using Dragon [employer] voice recognition dictation software.  As a result errors may occur.  When identified to these areas have been corrected.  While every attempt is made to correct  errors during dictation discrepancies may still exist.  Please contact if there are any errors.    Medical Decision Making      Disposition and Plan     Clinical Impression:  1. Micheale in pharynx         Disposition:  Discharge  11/26/2024  9:49 pm    Follow-up:  Justina Suarez MD  [address] [id number] [phone]    Schedule an appointment as soon as possible for a visit in 2 day(s)      Sidney Gonzalez MD  [address] [facility] [address] [phone]    Schedule an appointment as soon as possible for a visit in 3 day(s)            Medications Prescribed:  Current Discharge Medication List              Supplementary Documentation:

## 2024-12-23 NOTE — PROGRESS NOTES
HISTORY OF PRESENT ILLNESS  Chief Complaint   Patient presents with    Weight Problem     Referred by Primary, Metformin worked a little, open to medication.        Dexter Yoder is a 19 year old male new to our office today with Mom for initiation of medical weight loss program, referred by PCP.  Patient presents today with c/o excess weight over the past 5 years.    Reason/goal for weight loss: lose 1# per week    Previous weight loss efforts in the past: [facility], Metformin ER inconsistent with therapy- no SEs    Past 6 months lifestyle interventions: yes, regular exercise    Reviewed [facility] patient contract. Readiness for Lifestyle change: 7/10, Interest in Medication: 5/10, Bariatric surgery interest: 2/10.    Barriers to weight loss: late night eating, portions, carbs    Wt Readings from Last 6 Encounters:   12/26/24 286 lb (129.7 kg)   11/26/24 290 lb (131.5 kg)   08/16/24 285 lb 3.2 oz (129.4 kg)   06/21/24 291 lb 4.8 oz (132.1 kg)   06/14/24 193 lb (87.5 kg)   08/14/23 290 lb 14.4 oz (132 kg)          Social hx and lifestyle reviewed:    How many meals do you eat out per week: 2-3  Who is the primary cook in your home: Patient and Dad    Breakfast Lunch Dinner Snacks Fluids   eggs Eggs, rice, tofu, veggies Lamb roast, rice, veggies cookies water     Work: Senior at [employer]  Marital status: Single  Support: fair  Tobacco use: no  ETOH use: 1 serving/week  Supplements: no  Exercise: 4x/week via walking  Stress level: 4/10, coping via eating, playing video games  Sleep hours and integrity: 6-8 hours/night, no snoring, does not feel rested    MEDICAL HISTORY  PMH reviewed:   Cardiac disorders: negative  Depression/anxiety: negative  Glaucoma: negative  Kidney stones: negative  Eating disorder: negative  Migraines: negative  Seizures: negative  Joint-related conditions: negative  Liver disease: fatty liver  Renal disease: negative  Diabetes: negative  Thyroid disease: negative  Constipation:  negative  Other pertinent hx: n/a  Sleep Apnea hx: negative  Cancer hx: negative  Cholecystectomy and/or gallstones: negative  Family or personal history of Pancreatic issues / Medullary Thyroid Cancer/MENS 2: negative  History of bariatric surgery: negative    FMH reviewed: obesity in parent/s or sibling: yes    REVIEW OF SYSTEMS  GENERAL: feels well otherwise  SKIN: denies any rashes to skin folds  HEENT: snoring- no  LUNGS: denies shortness of breath with exertion, no apnea  CARDIOVASCULAR: denies chest pain on exertion, denies palpitations or pedal edema  GI: denies abdominal pain.  No N/V/D/C  MUSCULOSKELETAL: denies joint pains  NEURO: denies headaches  PSYCH: denies change in behavior or mood, denies feeling sad or depressed.    EXAM  /84   Pulse 85   Resp 20   Ht 6' 2.5\" (1.892 m)   Wt 286 lb (129.7 kg)   BMI 36.23 kg/m² ,   Percent body fat:  M >25%: 32%. VF: 17. Muscle Mass: 18.6%.  GENERAL: well developed, well nourished, in no apparent distress, obese  SKIN: warm, pink, dry without rashes to exposed area  EYES: conjunctiva pink, sclera non icteric, PERRLA  HEENT: atraumatic, normocephalic, O/p: Mallampati score- 3  NECK: supple, non tender, no adenopathy, no thyromegaly, +acanthosis nigricans to neck  LUNGS: CTA in all fields, breathing non labored  CARDIO: RRR without murmur, normal S1 and S2 without clicks or gallops, no pedal edema.   GI: +BS, soft, no masses, HSM or tenderness  MUSCULOSKELETAL: grossly intact  NEURO: Oriented times three  PSYCH: pleasant, cooperative, normal mood and affect    Lab Results   Component Value Date    WBC 7.0 08/22/2024    RBC 5.92 (H) 08/22/2024    HGB 16.5 08/22/2024    HCT 49.2 08/22/2024    MCV 83.1 08/22/2024    MCH 27.9 08/22/2024    MCHC 33.5 08/22/2024    RDW 12.3 08/22/2024    .0 08/22/2024     Lab Results   Component Value Date    GLU 96 08/22/2024    BUN 13 08/22/2024    CREATSERUM 1.02 08/22/2024    ANIONGAP 2 08/22/2024    CA 10.2  08/22/2024    OSMOCALC 288 08/22/2024    ALKPHO 69 11/25/2024    AST 27 11/25/2024    ALT 70 (H) 11/25/2024    BILT 0.9 11/25/2024    TP 7.2 11/25/2024    ALB 4.9 (H) 11/25/2024    GLOBULIN 2.2 08/22/2024     08/22/2024    K 4.0 08/22/2024     08/22/2024    CO2 31.0 08/22/2024     Lab Results   Component Value Date     08/12/2022    A1C 5.5 08/12/2022     Lab Results   Component Value Date    CHOLEST 166 08/22/2024    TRIG 157 (H) 08/22/2024    HDL 38 (L) 08/22/2024     (H) 08/22/2024    VLDL 26 08/22/2024    NONHDLC 128 08/22/2024     Lab Results   Component Value Date    TSH 3.530 08/18/2023     No results found for: \"B12\", \"VITB12\"  No results found for: \"VITD\", \"QVITD\", \"VAGC43VQ\"    Medications Ordered Prior to Encounter[1]    ASSESSMENT  Initial Weight Data and Goal Weight Loss:  Weight Calculations  Initial Weight: 286 lbs  Initial Weight Date: 12/26/24  Today's Weight: 286 lbs  5% Goal: 14.3  10% Goal: 28.6  Total Weight Loss: 0 lbs    Diagnoses and all orders for this visit:    Encounter for therapeutic drug monitoring  -     Vitamin D; Future  -     Vitamin B12; Future    Class 2 severe obesity with serious comorbidity and body mass index (BMI) of 36.0 to 36.9 in adult, unspecified obesity type (HCC)  - Continue Metformin ER as previously directed by PCP. Patient to check in to coverage for AOMs.  - Reviewed balanced plate nutrition with focus on whole food, regular meals daily that include protein and produce and eliminating/reducing late night eating.  - Counseled on the 4 Pillars of health (sleep, stress, nutrition and fitness).  - Reviewed weight synopsis in EMR   -     OP REFERRAL TO DIETIT[facility] ([facility] USE ONLY)  -     OP REFERRAL [facility]  -     Vitamin D; Future  -     Vitamin B12; Future    Dyslipidemia  -     OP REFERRAL TO [facility] ([facility] USE ONLY)  -     OP REFERRAL [facility] [place]  -     Vitamin D; Future  -     Vitamin B12; Future    Insulin  resistance  -     OP REFERRAL TO [facility] ([facility] USE ONLY)  -     OP REFERRAL [facility]  -     Vitamin D; Future  -     Vitamin B12; Future    Fatty liver  -     OP REFERRAL TO DIETITIAN [facility] ([facility] USE ONLY)  -     OP REFERRAL [facility]  -     Vitamin D; Future  -     Vitamin B12; Future        PLAN  Medication use and side effects reviewed with patient.  Medication contraindications: none foreseen  Follow up with dietitian and psychologist as recommended.  Discussed the role of sleep and stress in weight management.  Labs orders as above.  Counseled on comprehensive weight loss plan including attention to nutrition, exercise and behavior/stress management for success. See patient instruction below for more details.  Reviewed previous labs in EMR/Care Everywhere  Weight Loss Contract reviewed and signed.    Patient Instructions   Welcome to the [facility]...your Lifestyle Renovation begins now!  Thank you for placing your trust in our health care team, I look forward to working with you along this journey to better health!    Next steps:     1.  Call our office at [phone] to schedule a personal nutrition consultation with one of our registered dieticians, Carter Vigil. Bring along your food journal (3 days minimum). See journal options below.  2.  Complete non fasting (10-12 hours, water only) labs at [facility] lab site prior to next office visit. Lab results will be communicated via [employer].  3.  Body composition completed today with findings of: Total body fat: 32% (goal <25%), Visceral Fat: 17 (goal <10) and Muscle mass: 18.6% (goal > 21%).  4.  Fill your prescribed medication and take as discussed and prescribed: Continue Metformin ER as prescribed by your PCP. Look into coverage for anti-obesity medication, such as Wegovy or Zepbound, as the alternatives we reviewed today.    Does Metformin Cause Weight Loss?    Medically Reviewed by Precious  MD Jesús on December 28, 2023 Written by Dottie Johnson  [email or web address]    Metformin and Weight Loss   If you’ve been diagnosed with type 2 diabetes, metformin may be the first medication your doctor recommends. Research shows that it helps with blood sugar control by helping the body use its own insulin better.    Studies have also shown that many people taking the drug lose some weight. That's one reason metformin helps prevent diabetes in people who are overweight and at risk for type 2 diabetes. The FDA hasn't approved metformin for weight loss . But some doctors do prescribe it for that purpose. This is called off-label use.    How Metformin Works  Doctors aren’t entirely sure how metformin works to help you lose weight, but they suspect it’s a combination of things. Perhaps most importantly, it helps reduce your appetite. It does this in part by increasing levels of hormones that help you feel less hungry (GLP-1 and PYY). It also:    Limits how your liver produces glucose so that you may need less insulin    Improves insulin resistance so the insulin your body produces works better     Changes your gut microbiome, the microorganisms in your digestive system that help break down food    All of these things are also thought to play a role in weight loss.    Is Metformin for Weight Loss Effective?  Metformin isn’t a magic weight loss pill, but it could help you lose a modest amount of weight as well as prevent weight gain.    You likely won't lose as much weight as you would with some other diabetes drugs, such as semaglutide (Ozempic, Wegovy) or tirzepatide (Mounjaro, Zepbound). The FDA has approved Wegovy and Zepbound for treating weight loss.      In a long-term study involving over 3,000 people, the average weight loss for participants who took metformin was 5.5 pounds. About one-third of those who took metformin lost at least 5% of their body weight after a year. And  the longer they took it (up to 15 years), the better the results, with an average long-term loss of 6.2% of body weight.     But so far, metformin's weight-loss effects haven't been consistent enough for it to be considered a weight-loss drug. And it can't replace a healthy diet and regular exercise.    Who Uses Metformin for Weight Loss?  Most people who take metformin have type 2 diabetes. If you have type 2 diabetes, weight loss can slow down the disease.    Others who might be prescribed metformin for weight loss may include those who:    Have obesity, which doctors define as having a body mass index (BMI) of 30 or more. Studies show that even if you don’t have type 2 diabetes but have obesity, metformin can help with weight loss. Weight loss helps reduce your risk of developing type 2 diabetes.    Have insulin resistance, a condition in which your body makes the blood-sugar regulating hormone insulin but can't use it correctly. Metformin helps your body react to the insulin your pancreas produces.  Take antipsychotic medications. Some of these medications cause weight gain, which increases the risk of type 2 diabetes and other health problems such as high cholesterol. Metformin may help prevent weight gain if you take it when you're on antipsychotic medications.  Metformin and PCOS    Polycystic ovary syndrome (PCOS) is a hormonal condition that affects as many as 5 million women (and those designated as female at birth) in the [place] More than half develop type 2 diabetes by the time they're 40. PCOS also causes issues with your monthly cycle, can lead to pregnancy complications, and is a common cause of infertility. Not only can metformin help prevent weight gain in those with PCOS, but it may also help:    Restore ovulation  Reduce the amount of androgen, the male hormone responsible for many PCOS symptoms, in your body  Reduce your risk of miscarriage  Reduce your risk of gestational diabetes (diabetes during  pregnancy)    Who Can’t Take Metformin for Weight Loss?  Metformin can be a helpful drug for many people, but not everyone should take it. Your doctor may not prescribe metformin if you:    Have uncontrolled diabetes  Are allergic to the drug  Have a serious infection  Recently had a heart attack  Have heart failure  Have trouble breathing  Have blood circulation problems  Drink alcohol often or in large amounts   If you have serious kidney disease, your doctor probably won't recommend metformin. That's due to an increased risk of a rare but serious condition called lactic acidosis. But you might be able to take it if your kidney disease is in the early stages and your doctor thinks metformin is the best drug for you.    Previously, doctors didn't prescribe metformin to people with congestive heart disease or chronic liver disease. But experts now believe some people with these conditions can take metformin, as long as they're watched carefully to avoid complications.    Metformin Dosage for Weight Loss  As with many medications, your metformin dose will depend on your health. Most people take a step-up approach: Your doctor prescribes a lower dose, then gradually increases it or adds another medication until your blood sugar levels reach safer levels.    Your dosage also depends on what type of metformin you take:    Immediate-release: If you take this type of pill, you may start at 500 milligrams once or twice a day. Or you might take a once-daily dose of 835 milligrams. If necessary, your doctor can slowly increase this to a target dose, called a maintenance dose, of 850 or 1,000 milligrams twice daily.  Extended-release (capsules): You usually take these once a day at first, either 500 or 1,000 milligrams. Your doctor can increase your dose slowly, up to a maximum of 2,000 milligrams once or twice daily.  Metformin dosage for weight loss in non-diabetes    Metformin isn't approved as a weight loss medication, so  there's no labeling information about what dose you should be prescribed. However, doctors have learned what doses are likely best for their patients. In one study, people started by taking 500 milligrams daily and gradually increased to 2,500 milligrams. The average dose ended up being 2,230 milligrams per day. In another study, participants lost weight when they took 1,500 milligrams of metformin a day.    How to Take Metformin for Weight Loss  Whether you take metformin for weight loss or to treat diabetes, the medications are the same.    You take extended-release tablets once a day. Extended-release medications should never be chewed, cut, or broken up in any way. That will send too much of the drug into your system too quickly. If you have trouble swallowing your pill, ask your pharmacist about alternatives. If you keep having problems, talk to your doctor. You might need a different prescription.    You can also get metformin in standard tablets or suspension (liquid), which your body absorbs more quickly. You usually take these once or twice a day. If you are taking it in suspension form, always use an oral syringe, medication cup, or measuring spoon meant for medications. Kitchen measuring spoons aren't accurate enough for drugs.    Always take metformin with meals. It can upset your stomach or cause diarrhea when you first start taking it, but taking it with food helps reduce this effect.    Takeaways  Metformin is a common diabetes drug that doctors sometimes prescribe off-label for weight loss. It can help you lose modest amounts of weight and prevent weight gain. But it can't take the place of a healthy diet and regular exercise. If you're trying to lose weight, ask your doctor about the most effective methods.    Metformin for Weight Loss FAQs  How does metformin work in your body?    Metformin does several things in your body, such as reducing how much glucose (sugar) your liver releases into your  body. Researchers think one of the main reasons it works for weight loss is that it affects hormones involved in appetite, leading you to eat less.    Is Ozempic (semaglutide) more effective than metformin for weight loss?    These two medications work in different ways to control blood sugar and help with weight loss. Some studies have shown that people who take semaglutide (Ozempic) lose an average of 8.4 to 10.4 pounds. In a large, long-term study, people who took metformin lost an average of 5.5 pounds. Doctors sometimes prescribe semaglutide and metformin together. This can maximize weight loss.        Please try to work on the following dietary changes this first month:    1.  Drink water with meals and throughout the day, cut down on soda and/or juice if consumed. Consider flavored water options like Bubbly, Spindrift, Hint and Faby.  2.  Have protein with each meal, examples include: greek yogurt, cottage cheese, hard boiled egg, tofu, chicken, fish, or tuna. Recommended daily protein intake is 136 grams/day. Daily calories recommended: 1800 calories/day and <150 grams of carbohydrates in a day.  3.  Work towards reducing/eliminating refined carbohydrates and sugars which includes items such as sweets, as well as rice, pasta, and bread and make sure to choose whole grain options when having them with just 1 serving per meal about the size of your inner palm.  4.  Consume non starchy veggies daily working towards making them a good 50% of your daily food intake. Add them to lunch and dinner consistently.  5.  Start a daily probiotic: VSL#3 is recommended, (order on line at www.vsl3.com). Take 1 capsule daily with water for 30 days, then reduce to 1 every other day (this will reduce the cost). Capsules can be left out of refrigerator for 2 weeks. I recommend using a pill box weekly and keeping the bottle in the fridge.    Please download acosta My Fitness Pal, LoseIt! Or Net Diary to monitor daily dietary  intake and you will be able to see if you are eating the right amount of calories or too much or too little which would hinder weight loss. Additionally this will help to see your daily carbohydrate and protein intake. When you set the madisyn up choose 1-1.5 lbs/week as a goal.  Keeping a paper food journal is an option as well to remain accountable for your choices- this is the start to mindful eating! A low calorie diet has been consistently shown to support weight loss.    Continue or start exercising to help establish a routine. If not already exercising begin with 1 day/week and progress as able with the goal of working towards 30 minutes 5 days a week at a minimum. A variety or cardio, strength and stretching is important. Review resources below to help support you in building this healthy routine.    Meditation daily can help manage and control stress. Chronic stress can make weight loss difficult.  Exercising is one way to help with stress, but meditation using the CALM Madisyn or another comparable alternative can be done in your home or place of work with little time commitment. This Madisyn can also help work on behavior change and improve sleep. Check out the segment under Calm Masterclass and listen to The 4 Pillars of Health. A great way to begin learning about the foundation of lifestyle with practical tips to use in your every day. In addition, we offer counseling services and support for individual connection and care. A referral is necessary so please let me know if this is a service you are interested.    Check out www.yourweightmatters.org blog for continued support and education along your weight loss journey to optimal health!      Patient Resources:    Personal Training/Fitness Classes/Health Coaching    [facility] in [place]: Full fitness center with group fitness and personal training located in [place].  Health Coaching with Beverley Santiago, Vipul Shirley, and Julius Hughes at our [facility]- individual coaching to work on your health goals. Call [phone] and/or email @ [email or web address]. Free 60 minute consult when client of [facility].  [employer] [place] @ [email or web address]. A variety of group fitness options plus various yoga classes [phone] and/or email Yaritza at [email or web address]  [facility] Fitness Centers with multiple locations: Mesolight (www.Teleran Technologies), Vanilla Breeze5 Training (www.Nodality), Visual Mining Body Bootcamp (www.SafeAwakebodyboNew Relicp.Exajoule), Solace Lifesciences (www.MobiTX), The Exercise  (www.exercisecoach.com), Club PilAC Immune SA (www.hipages Group)    Online Fitness  Fitness  on [employer]view Records  Fit in 10 DVD series   www.natoo29TMCHistoric Futures  Chair exercises via Sit and Be Fit (www.sitandProteoSense.org) and Peatix (www.Casper) or Narciso Mcnair or Tim Enriquez videos on YouTube.  Hip Hop Fit with Joby Gironks at www.hiphopfit.Raw Science Inc.    Apps for on the Go Fitness  [place] 7 Minute Workout (orange box with white 7) - free on the go HIIT training madisyn  Peloton Madisyn @ www.onepeloton.com    Nutrition Trackers and Programs  LoseIT! And My Fitness Pal apps and on line for tracking nutrition  NOOM - virtual health coaching  FitFoundation (healthy meals on the go) in [place] @ www.mehyqruuredjm3g.Exajoule  Etta ANNE @ [employer].bistromd.Exajoule and Uqmprs68 (calorie smart and low carb plans recommended) @ www.jcnnnb57.com, Metabolic Meals @ www.MyMetabolicMeals.com - individual prepared meals to go  Gobble, Blue Apron, Home , Every Plate, Sunbasket- on line meal delivery programs for preparation at home  Meal Village in [place] for homemade meals to go @ www.mealvillage.com  Diet Doctor @ www.dietdoctor.com - low carb swaps    Stress, Anxiety, Depression, Trauma  CALM meditation madisyn (www.calm.com)  Headspace  Don't let anxiety run your life. Using the science of emotion regulation and mindfulness to overcome  fear and worry by Dexter Philip PsyD and Richar Mcdowell MA.  The [facility] Podcast (September 27, 2023): 6 Magic Words That Stop Anxiety  What Happened to You?- a look at the impact trauma has on behavior written by Anneliese Fu and Dr. Ryne Franco  Whole Again by Kan Shannon - discovering your true self after trauma    Mindful Eating/The Hungry Brain  Am I Hungry? Mindful eating virtual  acosta (www.amihungry.com)  The Hungry Brain by Vivi Culver, PhD  Mindless Eating by Rony Hernandez  Weight Loss Surgery Will Not Treat Food Addiction by Farzaneh López Ph.D    Metabolic Dysfunction, Hormones and Cravings  Why We Get Sick? By Sterling Brady (insulin resistance)  Your Body in Balance: The New Science of Food, Hormones, and Health by Dr. Catrachito Burton  The Complete Guide to fasting by Dr. Negrete  Fast Like a Girl by Dr. Светлана García  The M Factor (documentary on PBS about Menopause)  Sugar, Salt & Fat by Aleida Ye, Ph.D, R.D.  The Truth About Sugar - documentary on sugar (Free on hybris, https://[email or web address])  Presentation on SUGAR called Sugar: The Bitter Truth by Dr. Stevo Jeronimo (hybris) [email or web address]  Reverse Visceral Fat: #1 Way to Increase Your Lifespan & End Inflammation with Dr. Guanako Boogie on Utube @ [email or web address]    Nutrition Support  You Are What You Eat - Netfix series on twin study looking at impact of nutrition changes on health  The End of Dieting: How to Live for Life by Dr. Booker Bernardo M.D. or listen to The Moovweb Podcast Episode 63: Understanding \"Nutritarian\" Eating w/Dr. Booker Bernardo  The Game Changers- Netflix Documentary on plant based nutrition  The Dr. Kovacs T5 Wellness Plan by Dr. Rashid Kovacs MD  The Complete Guide to fasting by Dr. Negrete  @[facility] ([facility] Dietician with support surrounding nutrition and meal prep/planning)    Education, Motivation and Support Resources  Live  to 100: Secrets of the Blue Zones - Netflix series on the secrets to communities living over 100 years old  Atomic Habits by Meng Pereyra (a book about taking small steps to promote greater behavior change)   Motivation acosta (black box with white \")- daily supportive messages sent to your phone  Can't Hurt Me by Dexter Horton (a book exploring the power of discipline in achieving your goals)  Fed Up - documentary about obesity (Free on Utube)  Www.yourweightmatters.org - Obesity Action Coalition sponsored Blog posts  Obesity Action Coalition Resources on topics specific to weight management (www.obesityaction.org)  Fitlosophy Fitspiration - journal to better health (journal book found at [employer] in fitness aisle)  Monse Araujo talk titled: The Call to Courage (Netflix)  The Exam Room by the Physician's Committee (Podcast)  Nutrition Facts by Dr. Delaney (Podcast)    Balanced Nutrition includes:     Build the mentality of Food 4 Fuel. Clean eating with whole foods and eliminating/reducing ultra processed foods.  Be an intuitive eater and using mindful eating practices.  Eat a balanced plate with protein and produce at all meals: 1/4 plate- protein, 1/2 plate non starchy veggies, and 1/4 plate fruit or complex carbohydrate.  Drink water with all meals and use a salad plate to naturally reduce portions.  Eliminate/reduce late night eating by stopping after 7pm. Allowing your body to fast for 12 hours (drink only water, tea or black coffee without any additives).            What are proteins?  Proteins are one of three primary macronutrients that provide energy to the human body, along with fats and carbohydrates. Proteins are also responsible for a large portion of the work that is done in cells; they are necessary for proper structure and function of tissues and organs, and also act to regulate them. They are comprised of a number of amino acids that are essential to proper body function, and serve as the building blocks  of body tissue.  There are 20 different amino acids in total, and the sequence of amino acids determines a protein's structure and function. While some amino acids can be synthesized in the body, there are 9 amino acids that humans can only obtain from dietary sources (insufficient amounts of which may sometimes result in death), termed essential amino acids. Foods that provide all of the essential amino acids are called complete protein sources, and include both animal (meat, dairy, eggs, fish) as well as plant-based sources (soy, quinoa, buckwheat).    Proteins can be categorized based on the function they provide to the body. Below is a list of some types of proteins:  Antibody--proteins that protect the body from foreign particles, such as viruses and bacteria, by binding to them  Enzyme--proteins that help form new molecules as well as perform the many chemical reactions that occur throughout the body  Messenger--proteins that transmit signals throughout the body to maintain body processes  Structural component--proteins that act as building blocks for cells that ultimately allow the body to move  Transport/storage--proteins that move molecules throughout the body  As can be seen, proteins have many important roles throughout the body, and as such, it is important to provide sufficient nutrition to the body to maintain healthy protein levels.    How much protein do I need?  The amount of protein that the human body requires daily is dependent on many conditions, including overall energy intake, growth of the individual, and physical activity level. It is often estimated based on body weight, as a percentage of total caloric intake (10-35%), or based on age alone. 0.8g/kg of body weight is a commonly cited recommended dietary allowance (RDA). This value is the minimum recommended value to maintain basic nutritional requirements, but consuming more protein, up to a certain point, maybe beneficial, depending on  the sources of the protein.  The recommended range of protein intake is between 0.8 g/kg and 1.8 g/kg of body weight, dependent on the many factors listed above. People who are highly active, or who wish to build more muscle should generally consume more protein. Some sources suggest consuming between 1.8 to 2 g/kg for those who are highly active. The amount of protein a person should consume, to date, is not an exact science, and each individual should consult a specialist, be it a dietitian, doctor, or , to help determine their individual needs.    Foods high in protein  There are many different combinations of food that a person can eat to meet their protein intake requirements. For many people, a large portion of protein intake comes from meat and dairy, though it is possible to get enough protein while meeting certain dietary restrictions you might have. Generally, it is easier to meet your RDA of protein by consuming meat and dairy, but an excess of either can have a negative health impact. There are plenty of plant-based protein options, but they generally contain less protein in a given serving. Ideally, a person should consume a mixture of meat, dairy, and plant-based foods in order to meet their RDA and have a balanced diet replete with nutrients.    If possible, consuming a variety of complete proteins is recommended. A complete protein is a protein that contains a good amount of each of the nine essential amino acids required in the human diet. Examples of complete protein foods or meals include:    Meat/Dairy examples  Eggs  Chicken breast  Cottage cheese  Greek yogurt  Milk  Lean beef  Tuna  Turkey breast  Fish  Shrimp    Vegan/plant-based examples  Buckwheat  Hummus and manoj  Soy products (tofu, tempeh, edamame beans)  Peanut butter on toast or some other bread  Beans and rice  Quinoa  Hemp and osiel seeds  Spirulina  Generally, meat, poultry, fish, eggs, and dairy products are complete  protein sources. Nuts and seeds, legumes, grains, and vegetables, among other things, are usually incomplete proteins. There is nothing wrong with incomplete proteins however, and there are many healthy, high protein foods that are incomplete proteins. As long as you consume a sufficient variety of incomplete proteins to get all the required amino acids, it is not necessary to specifically eat complete protein foods. In fact, certain high fat red meats for example, a common source of complete proteins, can be unhealthy.   Below are some examples of high protein foods that are not complete proteins:  Almonds  Oats  Broccoli  Lentils  Stone bread  Davi seeds  Pumpkin seeds  Peanuts  Lapine sprouts  Grapefruit  Green peas  Avocados  Mushrooms  As can be seen, there are many different foods a person can consume to meet their RDA of protein. The examples provided above do not constitute an exhaustive list of high protein or complete protein foods. As with everything else, balance is important, and the examples provided above are an attempt at providing a list of healthier protein options (when consumed in moderation).    Amount of protein in common food      Protein Amount  Milk (1 cup/8 oz)  8 g  Egg (1 large/50 g)  6 g  Meat (1 slice / 2 oz)  14 g  Seafood (2 oz)  16 g  Bread (1 slice/64 g)   8 g  Corn (1 cup/166 g)  16 g  Rice (1 cup/195 g)  5 g  Dry Bean (1 cup/92 g)   16 g  Nuts (1 cup/92 g)    20 g  Fruits and Veggie (1 cup)  1 g    Data above taken from www.calculator.net    The Power of Protein:    High Protein Foods:  FISH  (3-6 ounces/meal)  All types of fish  Seafood (shrimp, scallops, clams, mussels, lobster)  EGGS     2-3 eggs/meal  DAIRY (2/3 to 1 ½ cup)  Cottage cheese   Greek yogurt  PLANTS (½-3/4 cup/meal)  Legumes: Dried beans and peas (black beans, escobar beans, garbanzo beans, kidney, cannellini, navy, split peas, black eyed peas)  Lentils  Quinoa  Soy (edamame, tofu)  PORK   (3-6  oz/meal)  Tenderloin  Pork chop  Top loin roast, boneless  Sirloin roast, boneless  [place] felton (nitrate free)  Boiled deli ham (nitrate free)    Additional Protein Sources:  BEEF    (3-6 oz/meal)  Flank steak       Skirt steak  Bottom round(rump roast), select   Ground beef, 90% lean (ground sirloin)  Rufus eye steak, choice  Eye of round roast, choice   POULTRY  (3-6 oz/meal)  Ground chicken or turkey  Chicken, no skin  Turkey, no skin  PROTEIN SHAKES: OWYN and Koia (plant based and dairy free), Corepower by Yap (plant based option available), Premier Protein, JuicePlus Complete (www.Playmatics.com)  PROTEIN BARS: RXBAR, PowerCrunch, Quest, Barebells, Mosh      Dear Doctor: I Carry All My Weight in My Abdomen. Is this Serious?    Answer provided by Leslie Brian MD    OAC at www.obesityaction.org Winter 2017 Resource    Research pertaining to obesity has become increasingly popular since the 1980s, and has since led to the classification of obesity as a disease that is defined as “the excessive accumulation of excess body fat. More so, in the last two decades, medical researchers are starting to understand how different types of fat send signals to the rest of our body. Current findings agree that fat acts as an endocrine organ - meaning that it is not a collection of dormant cells, but secretes hormones that influence the other organs in the body.    Fat deposits occur in two types:  First, there is the fat that lies directly under the skin - known as subcutaneous fat - which is readily noticeable as we try to squeeze into last year’s jeans.  Then, there is the other type - known as visceral fat - which lies deep in our body cavities, close to our organs such as the liver and the heart. It is this deep visceral fat that can place us at a greater risk for medical problems related to obesity.  Why is Visceral Fat Dangerous?  Visceral fat lies close to our organs, and thus it can easily cause organ  damage. For example, fat near the liver can accumulate in the liver cells and subsequently interfere with the liver’s normal function - to eliminate toxins in the body. Throughout decades, this can eventually lead to liver cancer. Visceral fat can also be converted into cholesterol by the liver and travel through the arteries, causing blockages that inhibit normal blood flow. This can lead to strokes and even heart attacks.    How Can I Determine if I Have Too Much Visceral Fat?  Fat deposits in various areas of the body depend on genetics, hormones and other factors. Fat deposits in the lower body, such as the hips, tend to be subcutaneous fat. Fat around the midsection, however, tends to be visceral fat. Because you mentioned that you carry more weight around your abdomen, I want to clearly define the cut-offs for what is considered to be a waist measurement consistent with obesity - and thus a greater health risk.    Measuring Your Waist  You can perform a simple measurement to determine if you are more likely to have excess visceral fat. Take a tape measure and place the loose end at your naval. Carefully wrap the tape around you (you may want to get help from a partner to be more exact) at the level of your naval until you reach the loose end. Then, record the number of inches measured. This is your waist circumference (WC).    A waist circumference greater than 35 inches for females, and greater than 40 inches for males, meets the criteria for obesity. This number is significant because it also correlates with a higher percentage of visceral fat as compared to someone below these measures.    In a medical setting, doctors use a special x-ray technique called “Dual-energy X-ray Absorptiometry (DEXA, DXA) which can capture an image of your bones, muscle and fat. DEXA can provide a more accurate assessment of the amount of visceral fat in your body. This test is also commonly used to measure bone density.    What  Can I Do to Reduce My Visceral Fat?  The good news is that visceral fat can be reversed by lifestyle changes. Although change will take some work, your heart and liver will thank you in the end!    Here are some simple lifestyle changes you can make to reduce the prevalence of visceral fat:    Reduce Your Calorie Consumption - Reducing the amount of calories you consume in general can help, but this is especially true of calories from fat sources. Specifically, your diet should contain 30 percent or fewer of its total calories from fats. Choose naturally-occurring fat sources in your diet, such as nuts and avocados, instead of processed fats like butter or hard cheese.    Read Labels Carefully - Be sure to watch the saturated fat content of the foods you eat. Ideally, the goal is to eat fewer than 7 percent of your total calories from this type of fat because it’s more likely to clog your blood vessels. These are common in foods such as meats and whole-fat dairy.    Have Very Few Trans Fats -These are the most dangerous! Trans fats tend to be present in packaged foods, desserts, etc.    Enjoy Your Food - Don’t worry, you can still enjoy whole grains, fruits, vegetables and lean proteins in the war against visceral fat.    Walk Frequently - Studies show that visceral fat was significantly reduced when the diet changes above were combined with moderate physical activity such as brisk walking. I recommend walking until you feel you are “huffing and puffing” but can still talk. This type of exercise at least six days a week for 30 minutes a day can help you win the war against disease!    Incorporate Strength Training - Building muscle helps fight fat, so don’t neglect resistance training. You can use your own body weight, weight machines or free weights to increase your muscle mass.  Finally, I recommend that you work with your healthcare provider for additional recommendations and to monitor your health as you fight  against visceral fat. Maintaining consistency with the above lifestyle changes takes dedication and time, so you may want to enlist a support system to help you maximize your outcome and optimize your health!    I recommend watching this segment on Utube. Reverse Visceral Fat: #1 Way to Increase Your Lifespan & End Inflammation with Dr. Guanako Boogie on Utube @ [email or web address]      Sugar - It’s Not as Sweet as You Think    by Xiao Fuentes RN, BSN, CBN  OAC Summer 2014 @ https://www.obesityaction.org/resources/sugar-its-not-as-sweet-as-you-think/    According to the [place] Department of Agriculture (USDA), dietary trends from 3762-5886, sugar consumption increased by 19 percent since 1970. Today, the average American consumes 20 teaspoons or 100 pounds of sugar each year! That amounts to 300 calories a day from sugar alone. And to make matters worse (you will read why later in this article), corn syrup consumption is on the rise, increasing by 387 percent in the same period of time. The largest amount of sugar is not being consumed in ingested fruits or other natural sugars. The largest amounts of consumed sugars are in the form of High Fructose Corn Syrup (HFCS) and brown sugar. Neither occurs naturally, and both are highly processed and in nearly every processed package food you will find in your local grocery store. Many of these foods you most likely would not suspect having sugar as an additive (see quiz at bottom of page).    What is sugar?  Sugar is a simple carbohydrate, which can either be a monosaccharide or disaccharide. Monosaccharides include glucose, fructose, and galactose. These three monosaccharides can join together to make the disaccharides maltose, sucrose, and lactose. These compounds are found in the foods we eat and are collectively called “sugar.”    The following are types of sugar and their natural source:    Most people associate the term “sugar”  with the white sugar we put in coffee or iced tea. The human body uses glucose, the simplest unit of carbohydrate, as its primary fuel. Without adequate carbohydrate intake, our bodies will obtain glucose, or fuel, from another source. The possibilities include a breakdown of proteins we eat or proteins stored in our body, which may ultimately lead to muscle loss and affecting one’s metabolic rate or even malnutrition. However, our need is far below the current daily consumption.    Is sugar addictive? You be the .  When high doses of sugar are consumed, it stimulates the release of dopamine in our brains. This response makes us feel pleasure (now you know why when you feel down or depressed you may want to overindulge in sweets). The drug morphine, cocaine and sugar all stimulate the same brain receptors. This study has been proven many times in lab rat studies.    In his new and fascinating book, Salt Sugar Fat: How the Food Giants Hooked Us, Pulitzer Prize-winning  Faisal Stoner (I highly recommend this book) goes inside the world of processed and packaged foods. Herbie writes in detail about how the food industry (which is 17 percent of our economy) contributes to American’s obesity epidemic by infusing processed foods with sugar, salt, and fat to make it more addictive and pleasurable. You see now why so many continue to buy their products?    According to the [facility] and Obesity, the average child sees 5,500 food commercials a year that advertise high sugar breakfast cereals, fast food, soft drinks, candy and snacks. According to the Federal Trade Commission, the food industry spends $1.6 billion annually to reach children through the media, including the Internet.    What is high fructose corn syrup?  High fructose corn syrup (HFCS) is an industrial food product and not “natural” or a naturally occurring substance. It is extracted from corn stalks through a secret process.  The sugars are extracted through a chemical enzymatic process resulting in HFCS.    Regular cane sugar (sucrose) is made of two-sugar molecules bound tightly together - glucose and fructose in equal amounts. The enzymes in your digestive tract must break down the sucrose into glucose and fructose, which are then absorbed into the body.    HFCS also consists of glucose and fructose, not in a 50-50 ratio, but a 55-45 fructose to glucose ratio in an unbound form. Fructose is sweeter than glucose. And HCFS is cheaper than sugar. One of the reasons is because of the government farm bill corn subsidies. Products with HFCS are much sweeter and much cheaper than products made with cane sugar.    Sugar and HFCS’ Effect on the Body  Eating sugar has a systemic effect on your entire body including increased risk for diabetes, increased appetite, weight gain, heart and liver problems, decreased immune system, certain cancers and even your brain function to name a few.    Type 2 Diabetes  Type 1 Diabetes is when one’s pancreas does not make insulin. Type 2 Diabetes is when one’s body does not utilize insulin effectively. Type 1 Diabetes is usually diagnosed at a young age. Type 2 Diabetes used to occur in adulthood and was called “Adult Onset Diabetes,” however; it has since been renamed to Type 2 Diabetes because the onset is commonly seen at a much earlier age as the obesity epidemic increases. It has been estimated that just fewer than 2,000,000 individuals were diagnosed with Type 2 diabetes in 2010.    The pancreas acts on ingested sugar by secreting insulin. Insulin is a hormone that regulates the amount of sugar in the blood. If blood sugar gets too high or too low, it could be life-threatening. An increased amount of sugar in one’s diet causes the pancreas to secrete insulin. In some individuals, this leads to an overload on the pancreas and the development of Type 2 diabetes.    Sugar, Appetite & Weight Gain  Eating  less sugar is linked with weight-loss, and eating more is linked with weight gain, according to a new review of published studies. The review lends support to the idea that advising people to limit the sugar in their diets may help lessen excess weight and obesity, the researchers conclude. “The really interesting finding is that increasing and decreasing sugar had virtually identical results (on weight), in the opposite direction of course,” says researcher LEAH Carlos, PhD, professor of human nutrition and medicine at the [facility] in [facility].    According to leading nutritional expert, Angelito Ye MD, PhD, MPH, chair of nutrition at [employer] and author of Eat, Drink and Be Healthy, “Sugar increases body weight mainly by encouraging overeating.”    Heart and Liver Damage  A study published in the journal Hepatology in late 2012 found that consumption of fructose appears to affect the availability of the energy-transferring chemical ATP in the liver, thereby increasing the risk of liver cell malfunction and death.    In another review of HFCS, The American Journal of Clinical Nutrition, Cesario Cervantes, PhD, Department of Nutrition, [facility] explains that HFCS is absorbed more rapidly than regular sugar, and that it doesn’t stimulate insulin or leptin production. This prevents you from triggering the body’s signals for being full and may lead to overconsumption of total calories.    A 2012 paper in the journal Nature, brought forward the idea that limitations and warnings should be placed on sugar similar to warnings we see on alcohol. The authors showed evidence that fructose and glucose in excess can have a toxic effect on the liver as the metabolism of ethanol (the alcohol contained in alcoholic beverages) had similarities to the metabolic pathways of fructose.    Another published study in the Journal of Nutrition in 2012, found that  children who consumed high levels of fructose had lower blood levels of cardiovascular protective compounds, such as HDL cholesterol and adiponectin. Higher consumption of fructose led to higher levels of fat around the midsection, a significant risk factor for diabetes and cardiovascular disease.    Immune System  Eliminating all sugar from a cancer patient’s diet would harm healthy cells that need energy to function. For example, many fruits contain high levels of antioxidants which are known to be effective in fighting cancer; however, sugars that come from whole fruits are low in sugar. Plant-based nutrition is a benefit to our overall health including fighting or preventing cancers. These important antioxidants, phytochemicals, fiber, vitamins and minerals are found in these plant-based whole foods.    However, diets high in sugar and refined carbohydrates can lead to overweight and obesity, which indirectly increases cancer risk throughout time. Certain cancers including breast, prostate, colorectal and pancreatic are associated with obesity.    How can you avoid these unhealthy consequences of (often hidden) sugar?  The best way to avoid sugar is to not consume obvious foods that are loaded with sugar. However, as discussed in this article, there are many packaged foods that surprisingly have added sugar and the more health-damaging high fructose corn syrup.  Therefore…    Eat nature’s foods  Avoid processed food (I call these factory foods, not real foods.)  Don’t eat foods in packages (or dramatically decrease consumption)  Eat foods that rot  Eat foods that walked the earth, flew in the cherie, swam in the ocean or grew in the soil     Beware!  Typically, the first ingredient on a label is the most prevalent in the food product; however, beware because there may be small amounts of many types of sugars, so none of them end up being in the first few ingredients (top 3) of the label. Sugar is disguised as  a “healthy” ingredient, such as honey, rice syrup, or even “organic dehydrated cane juice.”    Here is a list of some of the possible “sugar” code words:    Corn sweetener  Corn syrup, or corn syrup solids  Dehydrated Cane Juice  Dextrin  Dextrose  Fructose  Fruit juice concentrate  Glucose  High-fructose corn syrup  Honey  Invert sugar  Lactose  Maltodextrin  Malt syrup  Maltose  Maple syrup  Molasses  Raw sugar  Rice syrup  Saccharose  Sorghum or sorghum syrup  Sucrose  Syrup  Treacle  Turbinado sugar  Xylose    High Fructose Corn Sugar (HFCS) Quiz:    Which of the below listed foods contain High Fructose Corn Syrup?    Kraft Macaroni and Cheese  Stove Top Stuffing - Home style Herb  Mali Sun Iced Tea  Ocean Spray Cranberry Juice  Wild Cherry Lifesavers  Robitussan Cough and Congestion  Wonderbread - White Bread  Smuckers Grape Jelly  Jurado’s Vegetable Soup  Mr & Mrs T Bloody Lara Mix  Nabisco Fig Newtons - Whole Grain  [employer]abisco Fig Newtons - Fat Free  [employer] Classic [employer] Dressing  Chicken of the Sea White Tuna, [place]    Answer: All    Additional Resources:    The Truth About Sugar - documentary on sugar free on [employer] @ [email or web address]  SUGAR: The Bitter Truth by Dr. Stevo Jeronimo (pediatric endocrinologist) - Lecture on sugar for free on  [employer] @ [email or web address]      Return in about 6 months (around 6/26/2025) for weight management via clinic.    Patient verbalizes understanding.    Richa Mercado, APRN  12/23/2024    DOCUMENTATION OF TIME SPENT: Code selection for this visit was based on time spent : 60 minutes on date of service in preparing to see the patient, obtaining and/or reviewing separately obtained history, performing a medically appropriate examination, counseling and educating the patient/family/caregiver, ordering medications or testing, referring and communicating with other healthcare providers, documenting clinical information in the  electronic medical record, independently interpreting results and communicating results to the patient/family/caregiver and care coordination with the patient's other providers.         [1]   Current Outpatient Medications on File Prior to Visit   Medication Sig Dispense Refill    metFORMIN  MG Oral Tablet 24 Hr Take 2 tablets (1,000 mg total) by mouth 2 (two) times daily with meals. 360 tablet 1     No current facility-administered medications on file prior to visit.

## 2024-12-26 ENCOUNTER — OFFICE VISIT (OUTPATIENT)
Dept: INTERNAL MEDICINE CLINIC | Facility: CLINIC | Age: 19
End: 2024-12-26
Payer: COMMERCIAL

## 2024-12-26 VITALS
HEIGHT: 74.5 IN | RESPIRATION RATE: 20 BRPM | WEIGHT: 286 LBS | SYSTOLIC BLOOD PRESSURE: 124 MMHG | BODY MASS INDEX: 36.32 KG/M2 | HEART RATE: 85 BPM | DIASTOLIC BLOOD PRESSURE: 84 MMHG

## 2024-12-26 DIAGNOSIS — E66.01 CLASS 2 SEVERE OBESITY WITH SERIOUS COMORBIDITY AND BODY MASS INDEX (BMI) OF 36.0 TO 36.9 IN ADULT, UNSPECIFIED OBESITY TYPE (HCC): ICD-10-CM

## 2024-12-26 DIAGNOSIS — E66.812 CLASS 2 SEVERE OBESITY WITH SERIOUS COMORBIDITY AND BODY MASS INDEX (BMI) OF 36.0 TO 36.9 IN ADULT, UNSPECIFIED OBESITY TYPE (HCC): ICD-10-CM

## 2024-12-26 DIAGNOSIS — Z51.81 ENCOUNTER FOR THERAPEUTIC DRUG MONITORING: Primary | ICD-10-CM

## 2024-12-26 DIAGNOSIS — E88.819 INSULIN RESISTANCE: ICD-10-CM

## 2024-12-26 DIAGNOSIS — K76.0 FATTY LIVER: ICD-10-CM

## 2024-12-26 DIAGNOSIS — E78.5 DYSLIPIDEMIA: ICD-10-CM

## 2024-12-26 PROCEDURE — 3079F DIAST BP 80-89 MM HG: CPT | Performed by: NURSE PRACTITIONER

## 2024-12-26 PROCEDURE — 99215 OFFICE O/P EST HI 40 MIN: CPT | Performed by: NURSE PRACTITIONER

## 2024-12-26 PROCEDURE — 3074F SYST BP LT 130 MM HG: CPT | Performed by: NURSE PRACTITIONER

## 2024-12-26 PROCEDURE — 3008F BODY MASS INDEX DOCD: CPT | Performed by: NURSE PRACTITIONER

## 2024-12-26 NOTE — PATIENT INSTRUCTIONS
Welcome to the [facility]...your Lifestyle Renovation begins now!  Thank you for placing your trust in our health care team, I look forward to working with you along this journey to better health!    Next steps:     1.  Call our office at [phone] to schedule a personal nutrition consultation with one of our registered dieticians, Carter Vigil. Bring along your food journal (3 days minimum). See journal options below.  2.  Complete non fasting (10-12 hours, water only) labs at [facility] lab site prior to next office visit. Lab results will be communicated via Search to Phone.  3.  Body composition completed today with findings of: Total body fat: 32% (goal <25%), Visceral Fat: 17 (goal <10) and Muscle mass: 18.6% (goal > 21%).  4.  Fill your prescribed medication and take as discussed and prescribed: Continue Metformin ER as prescribed by your PCP. Look into coverage for anti-obesity medication, such as Wegovy or Zepbound, as the alternatives we reviewed today.    Does Metformin Cause Weight Loss?    Medically Reviewed by Precious Espinosa MD on December 28, 2023 Written by Dottie Johnson  [email or web address]    Metformin and Weight Loss   If you’ve been diagnosed with type 2 diabetes, metformin may be the first medication your doctor recommends. Research shows that it helps with blood sugar control by helping the body use its own insulin better.    Studies have also shown that many people taking the drug lose some weight. That's one reason metformin helps prevent diabetes in people who are overweight and at risk for type 2 diabetes. The FDA hasn't approved metformin for weight loss . But some doctors do prescribe it for that purpose. This is called off-label use.    How Metformin Works  Doctors aren’t entirely sure how metformin works to help you lose weight, but they suspect it’s a combination of things. Perhaps most importantly, it helps reduce your  appetite. It does this in part by increasing levels of hormones that help you feel less hungry (GLP-1 and PYY). It also:    Limits how your liver produces glucose so that you may need less insulin    Improves insulin resistance so the insulin your body produces works better     Changes your gut microbiome, the microorganisms in your digestive system that help break down food    All of these things are also thought to play a role in weight loss.    Is Metformin for Weight Loss Effective?  Metformin isn’t a magic weight loss pill, but it could help you lose a modest amount of weight as well as prevent weight gain.    You likely won't lose as much weight as you would with some other diabetes drugs, such as semaglutide (Ozempic, Wegovy) or tirzepatide (Mounjaro, Zepbound). The FDA has approved Wegovy and Zepbound for treating weight loss.      In a long-term study involving over 3,000 people, the average weight loss for participants who took metformin was 5.5 pounds. About one-third of those who took metformin lost at least 5% of their body weight after a year. And the longer they took it (up to 15 years), the better the results, with an average long-term loss of 6.2% of body weight.     But so far, metformin's weight-loss effects haven't been consistent enough for it to be considered a weight-loss drug. And it can't replace a healthy diet and regular exercise.    Who Uses Metformin for Weight Loss?  Most people who take metformin have type 2 diabetes. If you have type 2 diabetes, weight loss can slow down the disease.    Others who might be prescribed metformin for weight loss may include those who:    Have obesity, which doctors define as having a body mass index (BMI) of 30 or more. Studies show that even if you don’t have type 2 diabetes but have obesity, metformin can help with weight loss. Weight loss helps reduce your risk of developing type 2 diabetes.    Have insulin resistance, a condition in which your body  makes the blood-sugar regulating hormone insulin but can't use it correctly. Metformin helps your body react to the insulin your pancreas produces.  Take antipsychotic medications. Some of these medications cause weight gain, which increases the risk of type 2 diabetes and other health problems such as high cholesterol. Metformin may help prevent weight gain if you take it when you're on antipsychotic medications.  Metformin and PCOS    Polycystic ovary syndrome (PCOS) is a hormonal condition that affects as many as 5 million women (and those designated as female at birth) in the [place] More than half develop type 2 diabetes by the time they're 40. PCOS also causes issues with your monthly cycle, can lead to pregnancy complications, and is a common cause of infertility. Not only can metformin help prevent weight gain in those with PCOS, but it may also help:    Restore ovulation  Reduce the amount of androgen, the male hormone responsible for many PCOS symptoms, in your body  Reduce your risk of miscarriage  Reduce your risk of gestational diabetes (diabetes during pregnancy)    Who Can’t Take Metformin for Weight Loss?  Metformin can be a helpful drug for many people, but not everyone should take it. Your doctor may not prescribe metformin if you:    Have uncontrolled diabetes  Are allergic to the drug  Have a serious infection  Recently had a heart attack  Have heart failure  Have trouble breathing  Have blood circulation problems  Drink alcohol often or in large amounts   If you have serious kidney disease, your doctor probably won't recommend metformin. That's due to an increased risk of a rare but serious condition called lactic acidosis. But you might be able to take it if your kidney disease is in the early stages and your doctor thinks metformin is the best drug for you.    Previously, doctors didn't prescribe metformin to people with congestive heart disease or chronic liver disease. But experts now believe  some people with these conditions can take metformin, as long as they're watched carefully to avoid complications.    Metformin Dosage for Weight Loss  As with many medications, your metformin dose will depend on your health. Most people take a step-up approach: Your doctor prescribes a lower dose, then gradually increases it or adds another medication until your blood sugar levels reach safer levels.    Your dosage also depends on what type of metformin you take:    Immediate-release: If you take this type of pill, you may start at 500 milligrams once or twice a day. Or you might take a once-daily dose of 835 milligrams. If necessary, your doctor can slowly increase this to a target dose, called a maintenance dose, of 850 or 1,000 milligrams twice daily.  Extended-release (capsules): You usually take these once a day at first, either 500 or 1,000 milligrams. Your doctor can increase your dose slowly, up to a maximum of 2,000 milligrams once or twice daily.  Metformin dosage for weight loss in non-diabetes    Metformin isn't approved as a weight loss medication, so there's no labeling information about what dose you should be prescribed. However, doctors have learned what doses are likely best for their patients. In one study, people started by taking 500 milligrams daily and gradually increased to 2,500 milligrams. The average dose ended up being 2,230 milligrams per day. In another study, participants lost weight when they took 1,500 milligrams of metformin a day.    How to Take Metformin for Weight Loss  Whether you take metformin for weight loss or to treat diabetes, the medications are the same.    You take extended-release tablets once a day. Extended-release medications should never be chewed, cut, or broken up in any way. That will send too much of the drug into your system too quickly. If you have trouble swallowing your pill, ask your pharmacist about alternatives. If you keep having problems, talk to your  doctor. You might need a different prescription.    You can also get metformin in standard tablets or suspension (liquid), which your body absorbs more quickly. You usually take these once or twice a day. If you are taking it in suspension form, always use an oral syringe, medication cup, or measuring spoon meant for medications. Kitchen measuring spoons aren't accurate enough for drugs.    Always take metformin with meals. It can upset your stomach or cause diarrhea when you first start taking it, but taking it with food helps reduce this effect.    Takeaways  Metformin is a common diabetes drug that doctors sometimes prescribe off-label for weight loss. It can help you lose modest amounts of weight and prevent weight gain. But it can't take the place of a healthy diet and regular exercise. If you're trying to lose weight, ask your doctor about the most effective methods.    Metformin for Weight Loss FAQs  How does metformin work in your body?    Metformin does several things in your body, such as reducing how much glucose (sugar) your liver releases into your body. Researchers think one of the main reasons it works for weight loss is that it affects hormones involved in appetite, leading you to eat less.    Is Ozempic (semaglutide) more effective than metformin for weight loss?    These two medications work in different ways to control blood sugar and help with weight loss. Some studies have shown that people who take semaglutide (Ozempic) lose an average of 8.4 to 10.4 pounds. In a large, long-term study, people who took metformin lost an average of 5.5 pounds. Doctors sometimes prescribe semaglutide and metformin together. This can maximize weight loss.        Please try to work on the following dietary changes this first month:    1.  Drink water with meals and throughout the day, cut down on soda and/or juice if consumed. Consider flavored water options like Bubbly, Spindrift, Hint and Faby.  2.  Have  protein with each meal, examples include: greek yogurt, cottage cheese, hard boiled egg, tofu, chicken, fish, or tuna. Recommended daily protein intake is 136 grams/day. Daily calories recommended: 1800 calories/day and <150 grams of carbohydrates in a day.  3.  Work towards reducing/eliminating refined carbohydrates and sugars which includes items such as sweets, as well as rice, pasta, and bread and make sure to choose whole grain options when having them with just 1 serving per meal about the size of your inner palm.  4.  Consume non starchy veggies daily working towards making them a good 50% of your daily food intake. Add them to lunch and dinner consistently.  5.  Start a daily probiotic: VSL#3 is recommended, (order on line at www.vsl3.com). Take 1 capsule daily with water for 30 days, then reduce to 1 every other day (this will reduce the cost). Capsules can be left out of refrigerator for 2 weeks. I recommend using a pill box weekly and keeping the bottle in the fridge.    Please download acosta My Fitness Pal, LoseIt! Or Net Diary to monitor daily dietary intake and you will be able to see if you are eating the right amount of calories or too much or too little which would hinder weight loss. Additionally this will help to see your daily carbohydrate and protein intake. When you set the acosta up choose 1-1.5 lbs/week as a goal.  Keeping a paper food journal is an option as well to remain accountable for your choices- this is the start to mindful eating! A low calorie diet has been consistently shown to support weight loss.    Continue or start exercising to help establish a routine. If not already exercising begin with 1 day/week and progress as able with the goal of working towards 30 minutes 5 days a week at a minimum. A variety or cardio, strength and stretching is important. Review resources below to help support you in building this healthy routine.    Meditation daily can help manage and control stress.  Chronic stress can make weight loss difficult.  Exercising is one way to help with stress, but meditation using the CALM Madisyn or another comparable alternative can be done in your home or place of work with little time commitment. This Madisyn can also help work on behavior change and improve sleep. Check out the segment under Calm Masterclass and listen to The 4 Pillars of Health. A great way to begin learning about the foundation of lifestyle with practical tips to use in your every day. In addition, we offer counseling services and support for individual connection and care. A referral is necessary so please let me know if this is a service you are interested.    Check out www.yourweightmatters.org blog for continued support and education along your weight loss journey to optimal health!      Patient Resources:    Personal Training/Fitness Classes/Health Coaching    [facility] in [place]: Full fitness center with group fitness and personal training located in [place].  Health Coaching with Beverley Santiago, Vipul Shirley, and Julius Hughes at our [facility]- individual coaching to work on your health goals. Call [phone] and/or email @ [email or web address]. Free 60 minute consult when client of [employer] [facility].  [facility] @ http://[email or web address]. A variety of group fitness options plus various yoga classes [phone] and/or email Yaritza at [email or web address]  [facility]itness Centers with multiple locations: [employer] Fitness (www.Floxx.X Plus Two Solutions), F45 Training (www.e57lozzenwo.X Plus Two Solutions), Fit Body Bootcamp (www.Sparkcentralbodybootcamp.com), [place] (www.Lifeenergy.X Plus Two Solutions), The Exercise  (www.exercisecoach.com), Club Pilates (www.clubpilates.com)    Online Fitness  Fitness  on Utube  Fit in 10 DVD series   www.vufhy65RNQ.X Plus Two Solutions  Chair exercises via Sit and Be Fit (www.sitandbefit.org) and Grow Young Fitness  (www.Coltello Ristorante.com) or Narciso Mcnair or Tim Enriquez videos on YouTube.  Hip Hop Fit with Joby Shah at www.hiphopfit.net    Apps for on the Go Fitness  [place] 7 Minute Workout (orange box with white 7) - free on the go HIIT training madisyn  Peloton Madisyn @ www.onepeloton.com    Nutrition Trackers and Programs  LoseIT! And My Fitness Pal apps and on line for tracking nutrition  NOOM - virtual health coaching  FitFoundation (healthy meals on the go) in [place] @ www.xptmfieldxzcu5fCitizens Rx  Bistro MD @ wwwEdgeWave Inc.bistromdCitizens Rx and Chbcfk81 (calorie smart and low carb plans recommended) @ www.dwszjd17.com, Metabolic Meals @ www.MyMetabolicMeals.com - individual prepared meals to go  Gobble, Blue Apron, Home , Every Plate, Sunbasket- on line meal delivery programs for preparation at home  [place] in [place] for homemade meals to go @ www.mealViepage.Flud  Diet Doctor @ www.dietdoctor.com - low carb swaps    Stress, Anxiety, Depression, Trauma  CALM meditation madisyn (www.calm.com)  Headspace  Don't let anxiety run your life. Using the science of emotion regulation and mindfulness to overcome fear and worry by Dexter Philip PsyD and Richar Mcdowell MA.  The Kiwi Crate Podcast (September 27, 2023): 6 Magic Words That Stop Anxiety  What Happened to You?- a look at the impact trauma has on behavior written by Anneliese Fu and Dr. Ryne Franco  Whole Again by Kan Shannon - discovering your true self after trauma    Mindful Eating/The Hungry Brain  Am I Hungry? Mindful eating virtual  madisyn (www.uStudio.com)  The Hungry Brain by Vivi Culver, PhD  Mindless Eating by Rony Hernandez  Weight Loss Surgery Will Not Treat Food Addiction by Farzaneh López Ph.D    Metabolic Dysfunction, Hormones and Cravings  Why We Get Sick? By Sterling Brady (insulin resistance)  Your Body in Balance: The New Science of Food, Hormones, and Health by Dr. Catrachito Burton  The Complete Guide to fasting by Dr. Negrete  Fast Like a Girl by  Dr. Светлана García  The M Factor (documentary on PBS about Menopause)  Sugar, Salt & Fat by Aleida Ye, Ph.D, R.D.  The Truth About Sugar - documentary on sugar (Free on Utube, https://youtu.be/3B1afcmGP0y)  Presentation on SUGAR called Sugar: The Bitter Truth by Dr. Stevo Jeronimo (Utube) https://youtu.be/[email or web address]-oM?[email or web address]  Reverse Visceral Fat: #1 Way to Increase Your Lifespan & End Inflammation with Dr. Guanako Boogie on Utube @ https://youtu.be/nupPRnvUpJY?si=[email or web address]    Nutrition Support  You Are What You Eat - Netfix series on twin study looking at impact of nutrition changes on health  The End of Dieting: How to Live for Life by Dr. Booker Bernardo M.D. or listen to The Provident Link Podcast Episode 63: Understanding \"Nutritarian\" Eating w/Dr. Booker Bernardo  The Game Changers- Netflix Documentary on plant based nutrition  The Dr. Kovacs T5 Wellness Plan by Dr. Rashid Kovacs MD  The Complete Guide to fasting by Dr. Negrete  @[facility] ([facility] Dietician with support surrounding nutrition and meal prep/planning)    Education, Motivation and Support Resources  Live to 100: Secrets of the Blue Zones - Netflix series on the secrets to communities living over 100 years old  Atomic Habits by Meng Pereyra (a book about taking small steps to promote greater behavior change)   Motivation acosta (black box with white \")- daily supportive messages sent to your phone  Can't Hurt Me by Dexter Horton (a book exploring the power of discipline in achieving your goals)  Fed Up - documentary about obesity (Free on Utube)  Www.yourweightmatters.org - Obesity Action Coalition sponsored Blog posts  Obesity Action Coalition Resources on topics specific to weight management (www.obesityaction.org)  Fitlosophy Fitspiration - journal to better health (journal book found at [employer] in fitness aisle)  Monse Araujo talk titled: The Call to Courage (Netflix)  The Exam Room by the Physician's Committee (Podcast)  Nutrition Facts  by Dr. Delaney (Podcast)    Balanced Nutrition includes:     Build the mentality of Food 4 Fuel. Clean eating with whole foods and eliminating/reducing ultra processed foods.  Be an intuitive eater and using mindful eating practices.  Eat a balanced plate with protein and produce at all meals: 1/4 plate- protein, 1/2 plate non starchy veggies, and 1/4 plate fruit or complex carbohydrate.  Drink water with all meals and use a salad plate to naturally reduce portions.  Eliminate/reduce late night eating by stopping after 7pm. Allowing your body to fast for 12 hours (drink only water, tea or black coffee without any additives).            What are proteins?  Proteins are one of three primary macronutrients that provide energy to the human body, along with fats and carbohydrates. Proteins are also responsible for a large portion of the work that is done in cells; they are necessary for proper structure and function of tissues and organs, and also act to regulate them. They are comprised of a number of amino acids that are essential to proper body function, and serve as the building blocks of body tissue.  There are 20 different amino acids in total, and the sequence of amino acids determines a protein's structure and function. While some amino acids can be synthesized in the body, there are 9 amino acids that humans can only obtain from dietary sources (insufficient amounts of which may sometimes result in death), termed essential amino acids. Foods that provide all of the essential amino acids are called complete protein sources, and include both animal (meat, dairy, eggs, fish) as well as plant-based sources (soy, quinoa, buckwheat).    Proteins can be categorized based on the function they provide to the body. Below is a list of some types of proteins:  Antibody--proteins that protect the body from foreign particles, such as viruses and bacteria, by binding to them  Enzyme--proteins that help form new molecules as  well as perform the many chemical reactions that occur throughout the body  Messenger--proteins that transmit signals throughout the body to maintain body processes  Structural component--proteins that act as building blocks for cells that ultimately allow the body to move  Transport/storage--proteins that move molecules throughout the body  As can be seen, proteins have many important roles throughout the body, and as such, it is important to provide sufficient nutrition to the body to maintain healthy protein levels.    How much protein do I need?  The amount of protein that the human body requires daily is dependent on many conditions, including overall energy intake, growth of the individual, and physical activity level. It is often estimated based on body weight, as a percentage of total caloric intake (10-35%), or based on age alone. 0.8g/kg of body weight is a commonly cited recommended dietary allowance (RDA). This value is the minimum recommended value to maintain basic nutritional requirements, but consuming more protein, up to a certain point, maybe beneficial, depending on the sources of the protein.  The recommended range of protein intake is between 0.8 g/kg and 1.8 g/kg of body weight, dependent on the many factors listed above. People who are highly active, or who wish to build more muscle should generally consume more protein. Some sources suggest consuming between 1.8 to 2 g/kg for those who are highly active. The amount of protein a person should consume, to date, is not an exact science, and each individual should consult a specialist, be it a dietitian, doctor, or , to help determine their individual needs.    Foods high in protein  There are many different combinations of food that a person can eat to meet their protein intake requirements. For many people, a large portion of protein intake comes from meat and dairy, though it is possible to get enough protein while meeting  certain dietary restrictions you might have. Generally, it is easier to meet your RDA of protein by consuming meat and dairy, but an excess of either can have a negative health impact. There are plenty of plant-based protein options, but they generally contain less protein in a given serving. Ideally, a person should consume a mixture of meat, dairy, and plant-based foods in order to meet their RDA and have a balanced diet replete with nutrients.    If possible, consuming a variety of complete proteins is recommended. A complete protein is a protein that contains a good amount of each of the nine essential amino acids required in the human diet. Examples of complete protein foods or meals include:    Meat/Dairy examples  Eggs  Chicken breast  Cottage cheese  Greek yogurt  Milk  Lean beef  Tuna  Turkey breast  Fish  Shrimp    Vegan/plant-based examples  Buckwheat  Hummus and manoj  Soy products (tofu, tempeh, edamame beans)  Peanut butter on toast or some other bread  Beans and rice  Quinoa  Hemp and davi seeds  Spirulina  Generally, meat, poultry, fish, eggs, and dairy products are complete protein sources. Nuts and seeds, legumes, grains, and vegetables, among other things, are usually incomplete proteins. There is nothing wrong with incomplete proteins however, and there are many healthy, high protein foods that are incomplete proteins. As long as you consume a sufficient variety of incomplete proteins to get all the required amino acids, it is not necessary to specifically eat complete protein foods. In fact, certain high fat red meats for example, a common source of complete proteins, can be unhealthy.   Below are some examples of high protein foods that are not complete proteins:  Almonds  Oats  Broccoli  Lentils  Stone bread  Davi seeds  Pumpkin seeds  Peanuts  [place] sprouts  Grapefruit  Green peas  Avocados  Mushrooms  As can be seen, there are many different foods a person can consume to meet their RDA  of protein. The examples provided above do not constitute an exhaustive list of high protein or complete protein foods. As with everything else, balance is important, and the examples provided above are an attempt at providing a list of healthier protein options (when consumed in moderation).    Amount of protein in common food      Protein Amount  Milk (1 cup/8 oz)  8 g  Egg (1 large/50 g)  6 g  Meat (1 slice / 2 oz)  14 g  Seafood (2 oz)  16 g  Bread (1 slice/64 g)   8 g  Corn (1 cup/166 g)  16 g  Rice (1 cup/195 g)  5 g  Dry Bean (1 cup/92 g)   16 g  Nuts (1 cup/92 g)    20 g  Fruits and Veggie (1 cup)  1 g    Data above taken from www.calculator.net    The Power of Protein:    High Protein Foods:  FISH  (3-6 ounces/meal)  All types of fish  Seafood (shrimp, scallops, clams, mussels, lobster)  EGGS     2-3 eggs/meal  DAIRY (2/3 to 1 ½ cup)  Cottage cheese   Greek yogurt  PLANTS (½-3/4 cup/meal)  Legumes: Dried beans and peas (black beans, escobar beans, garbanzo beans, kidney, cannellini, navy, split peas, black eyed peas)  Lentils  Quinoa  Soy (edamame, tofu)  PORK   (3-6 oz/meal)  Tenderloin  Pork chop  Top loin roast, boneless  Sirloin roast, boneless  Togolese felton (nitrate free)  Boiled deli ham (nitrate free)    Additional Protein Sources:  BEEF    (3-6 oz/meal)  Flank steak       Skirt steak  Bottom round(rump roast), select   Ground beef, 90% lean (ground sirloin)  Rufus eye steak, choice  Eye of round roast, choice   POULTRY  (3-6 oz/meal)  Ground chicken or turkey  Chicken, no skin  Turkey, no skin  PROTEIN SHAKES: OWYN and Koia (plant based and dairy free), Corepower by PhysicianPortal, Malachi (plant based option available), Premier Protein, JuicePlus Complete (www.juiceplus.com)  PROTEIN BARS: RXBAR, PowerCrunch, Quest, Barebells, Mosh      Dear Doctor: I Carry All My Weight in My Abdomen. Is this Serious?    Answer provided by Leslie Brian MD    OAC at www.obesityaction.org Winter 2017  Resource    Research pertaining to obesity has become increasingly popular since the 1980s, and has since led to the classification of obesity as a disease that is defined as “the excessive accumulation of excess body fat. More so, in the last two decades, medical researchers are starting to understand how different types of fat send signals to the rest of our body. Current findings agree that fat acts as an endocrine organ - meaning that it is not a collection of dormant cells, but secretes hormones that influence the other organs in the body.    Fat deposits occur in two types:  First, there is the fat that lies directly under the skin - known as subcutaneous fat - which is readily noticeable as we try to squeeze into last year’s jeans.  Then, there is the other type - known as visceral fat - which lies deep in our body cavities, close to our organs such as the liver and the heart. It is this deep visceral fat that can place us at a greater risk for medical problems related to obesity.  Why is Visceral Fat Dangerous?  Visceral fat lies close to our organs, and thus it can easily cause organ damage. For example, fat near the liver can accumulate in the liver cells and subsequently interfere with the liver’s normal function - to eliminate toxins in the body. Throughout decades, this can eventually lead to liver cancer. Visceral fat can also be converted into cholesterol by the liver and travel through the arteries, causing blockages that inhibit normal blood flow. This can lead to strokes and even heart attacks.    How Can I Determine if I Have Too Much Visceral Fat?  Fat deposits in various areas of the body depend on genetics, hormones and other factors. Fat deposits in the lower body, such as the hips, tend to be subcutaneous fat. Fat around the midsection, however, tends to be visceral fat. Because you mentioned that you carry more weight around your abdomen, I want to clearly define the cut-offs for what is  considered to be a waist measurement consistent with obesity - and thus a greater health risk.    Measuring Your Waist  You can perform a simple measurement to determine if you are more likely to have excess visceral fat. Take a tape measure and place the loose end at your naval. Carefully wrap the tape around you (you may want to get help from a partner to be more exact) at the level of your naval until you reach the loose end. Then, record the number of inches measured. This is your waist circumference (WC).    A waist circumference greater than 35 inches for females, and greater than 40 inches for males, meets the criteria for obesity. This number is significant because it also correlates with a higher percentage of visceral fat as compared to someone below these measures.    In a medical setting, doctors use a special x-ray technique called “Dual-energy X-ray Absorptiometry (DEXA, DXA) which can capture an image of your bones, muscle and fat. DEXA can provide a more accurate assessment of the amount of visceral fat in your body. This test is also commonly used to measure bone density.    What Can I Do to Reduce My Visceral Fat?  The good news is that visceral fat can be reversed by lifestyle changes. Although change will take some work, your heart and liver will thank you in the end!    Here are some simple lifestyle changes you can make to reduce the prevalence of visceral fat:    Reduce Your Calorie Consumption - Reducing the amount of calories you consume in general can help, but this is especially true of calories from fat sources. Specifically, your diet should contain 30 percent or fewer of its total calories from fats. Choose naturally-occurring fat sources in your diet, such as nuts and avocados, instead of processed fats like butter or hard cheese.    Read Labels Carefully - Be sure to watch the saturated fat content of the foods you eat. Ideally, the goal is to eat fewer than 7 percent of your total  calories from this type of fat because it’s more likely to clog your blood vessels. These are common in foods such as meats and whole-fat dairy.    Have Very Few Trans Fats -These are the most dangerous! Trans fats tend to be present in packaged foods, desserts, etc.    Enjoy Your Food - Don’t worry, you can still enjoy whole grains, fruits, vegetables and lean proteins in the war against visceral fat.    Walk Frequently - Studies show that visceral fat was significantly reduced when the diet changes above were combined with moderate physical activity such as brisk walking. I recommend walking until you feel you are “huffing and puffing” but can still talk. This type of exercise at least six days a week for 30 minutes a day can help you win the war against disease!    Incorporate Strength Training - Building muscle helps fight fat, so don’t neglect resistance training. You can use your own body weight, weight machines or free weights to increase your muscle mass.  Finally, I recommend that you work with your healthcare provider for additional recommendations and to monitor your health as you fight against visceral fat. Maintaining consistency with the above lifestyle changes takes dedication and time, so you may want to enlist a support system to help you maximize your outcome and optimize your health!    I recommend watching this segment on Utube. Reverse Visceral Fat: #1 Way to Increase Your Lifespan & End Inflammation with Dr. Guanako Boogie on Utube @ [email or web address]      Sugar - It’s Not as Sweet as You Think    by Xiao Fuentes RN, BSN, CBN  OAC Summer 2014 @ https://www.obesityaction.org/resources/sugar-its-not-as-sweet-as-you-think/    According to the [place] Department of Agriculture (USDA), dietary trends from 3709-9666, sugar consumption increased by 19 percent since 1970. Today, the average American consumes 20 teaspoons or 100 pounds of sugar each year! That  amounts to 300 calories a day from sugar alone. And to make matters worse (you will read why later in this article), corn syrup consumption is on the rise, increasing by 387 percent in the same period of time. The largest amount of sugar is not being consumed in ingested fruits or other natural sugars. The largest amounts of consumed sugars are in the form of High Fructose Corn Syrup (HFCS) and brown sugar. Neither occurs naturally, and both are highly processed and in nearly every processed package food you will find in your local grocery store. Many of these foods you most likely would not suspect having sugar as an additive (see quiz at bottom of page).    What is sugar?  Sugar is a simple carbohydrate, which can either be a monosaccharide or disaccharide. Monosaccharides include glucose, fructose, and galactose. These three monosaccharides can join together to make the disaccharides maltose, sucrose, and lactose. These compounds are found in the foods we eat and are collectively called “sugar.”    The following are types of sugar and their natural source:    Most people associate the term “sugar” with the white sugar we put in coffee or iced tea. The human body uses glucose, the simplest unit of carbohydrate, as its primary fuel. Without adequate carbohydrate intake, our bodies will obtain glucose, or fuel, from another source. The possibilities include a breakdown of proteins we eat or proteins stored in our body, which may ultimately lead to muscle loss and affecting one’s metabolic rate or even malnutrition. However, our need is far below the current daily consumption.    Is sugar addictive? You be the .  When high doses of sugar are consumed, it stimulates the release of dopamine in our brains. This response makes us feel pleasure (now you know why when you feel down or depressed you may want to overindulge in sweets). The drug morphine, cocaine and sugar all stimulate the same brain receptors. This  study has been proven many times in lab rat studies.    In his new and fascinating book, Salt Sugar Fat: How the Food Giants Hooked Us, Pulitzer Prize-winning  Faisal Stoner (I highly recommend this book) goes inside the world of processed and packaged foods. Herbie writes in detail about how the food industry (which is 17 percent of our economy) contributes to American’s obesity epidemic by infusing processed foods with sugar, salt, and fat to make it more addictive and pleasurable. You see now why so many continue to buy their products?    According to the [facility] Food Policy and Obesity, the average child sees 5,500 food commercials a year that advertise high sugar breakfast cereals, fast food, soft drinks, candy and snacks. According to the Federal Trade Commission, the food industry spends $1.6 billion annually to reach children through the media, including the Internet.    What is high fructose corn syrup?  High fructose corn syrup (HFCS) is an industrial food product and not “natural” or a naturally occurring substance. It is extracted from corn stalks through a secret process. The sugars are extracted through a chemical enzymatic process resulting in HFCS.    Regular cane sugar (sucrose) is made of two-sugar molecules bound tightly together - glucose and fructose in equal amounts. The enzymes in your digestive tract must break down the sucrose into glucose and fructose, which are then absorbed into the body.    HFCS also consists of glucose and fructose, not in a 50-50 ratio, but a 55-45 fructose to glucose ratio in an unbound form. Fructose is sweeter than glucose. And HCFS is cheaper than sugar. One of the reasons is because of the government farm bill corn subsidies. Products with HFCS are much sweeter and much cheaper than products made with cane sugar.    Sugar and HFCS’ Effect on the Body  Eating sugar has a systemic effect on your entire body including increased risk for diabetes,  increased appetite, weight gain, heart and liver problems, decreased immune system, certain cancers and even your brain function to name a few.    Type 2 Diabetes  Type 1 Diabetes is when one’s pancreas does not make insulin. Type 2 Diabetes is when one’s body does not utilize insulin effectively. Type 1 Diabetes is usually diagnosed at a young age. Type 2 Diabetes used to occur in adulthood and was called “Adult Onset Diabetes,” however; it has since been renamed to Type 2 Diabetes because the onset is commonly seen at a much earlier age as the obesity epidemic increases. It has been estimated that just fewer than 2,000,000 individuals were diagnosed with Type 2 diabetes in 2010.    The pancreas acts on ingested sugar by secreting insulin. Insulin is a hormone that regulates the amount of sugar in the blood. If blood sugar gets too high or too low, it could be life-threatening. An increased amount of sugar in one’s diet causes the pancreas to secrete insulin. In some individuals, this leads to an overload on the pancreas and the development of Type 2 diabetes.    Sugar, Appetite & Weight Gain  Eating less sugar is linked with weight-loss, and eating more is linked with weight gain, according to a new review of published studies. The review lends support to the idea that advising people to limit the sugar in their diets may help lessen excess weight and obesity, the researchers conclude. “The really interesting finding is that increasing and decreasing sugar had virtually identical results (on weight), in the opposite direction of course,” says researcher LEAH Carlos, PhD, professor of human nutrition and medicine at the [facility] in [place].    According to leading nutritional expert, Angelito Ye MD, PhD, MPH, chair of nutrition at [employer] and author of Eat, Drink and Be Healthy, “Sugar increases body weight mainly by encouraging overeating.”    Heart and Liver Damage  A  study published in the journal Hepatology in late 2012 found that consumption of fructose appears to affect the availability of the energy-transferring chemical ATP in the liver, thereby increasing the risk of liver cell malfunction and death.    In another review of HFCS, The American Journal of Clinical Nutrition, Cesario Cervantes, PhD, Department of Nutrition, [facility] explains that HFCS is absorbed more rapidly than regular sugar, and that it doesn’t stimulate insulin or leptin production. This prevents you from triggering the body’s signals for being full and may lead to overconsumption of total calories.    A 2012 paper in the journal Nature, brought forward the idea that limitations and warnings should be placed on sugar similar to warnings we see on alcohol. The authors showed evidence that fructose and glucose in excess can have a toxic effect on the liver as the metabolism of ethanol (the alcohol contained in alcoholic beverages) had similarities to the metabolic pathways of fructose.    Another published study in the Journal of Nutrition in 2012, found that children who consumed high levels of fructose had lower blood levels of cardiovascular protective compounds, such as HDL cholesterol and adiponectin. Higher consumption of fructose led to higher levels of fat around the midsection, a significant risk factor for diabetes and cardiovascular disease.    Immune System  Eliminating all sugar from a cancer patient’s diet would harm healthy cells that need energy to function. For example, many fruits contain high levels of antioxidants which are known to be effective in fighting cancer; however, sugars that come from whole fruits are low in sugar. Plant-based nutrition is a benefit to our overall health including fighting or preventing cancers. These important antioxidants, phytochemicals, fiber, vitamins and minerals are found in these plant-based whole foods.    However, diets  high in sugar and refined carbohydrates can lead to overweight and obesity, which indirectly increases cancer risk throughout time. Certain cancers including breast, prostate, colorectal and pancreatic are associated with obesity.    How can you avoid these unhealthy consequences of (often hidden) sugar?  The best way to avoid sugar is to not consume obvious foods that are loaded with sugar. However, as discussed in this article, there are many packaged foods that surprisingly have added sugar and the more health-damaging high fructose corn syrup.  Therefore…    Eat nature’s foods  Avoid processed food (I call these factory foods, not real foods.)  Don’t eat foods in packages (or dramatically decrease consumption)  Eat foods that rot  Eat foods that walked the earth, flew in the cherie, swam in the ocean or grew in the soil     Beware!  Typically, the first ingredient on a label is the most prevalent in the food product; however, beware because there may be small amounts of many types of sugars, so none of them end up being in the first few ingredients (top 3) of the label. Sugar is disguised as a “healthy” ingredient, such as honey, rice syrup, or even “organic dehydrated cane juice.”    Here is a list of some of the possible “sugar” code words:    Corn sweetener  Corn syrup, or corn syrup solids  Dehydrated Cane Juice  Dextrin  Dextrose  Fructose  Fruit juice concentrate  Glucose  High-fructose corn syrup  Honey  Invert sugar  Lactose  Maltodextrin  Malt syrup  Maltose  Maple syrup  Molasses  Raw sugar  Rice syrup  Saccharose  Sorghum or sorghum syrup  Sucrose  Syrup  Treacle  Turbinado sugar  Xylose    High Fructose Corn Sugar (HFCS) Quiz:    Which of the below listed foods contain High Fructose Corn Syrup?    Kraft Macaroni and Cheese  Stove Top Stuffing - Home style Herb  Mali Sun Iced Tea  Ocean Spray Cranberry Juice  Wild Cherry Lifesavers  Robitussan Cough and Congestion  Wonderbread - White  Bread  Smuckers Grape Jelly  Jurado’s Vegetable Soup  Mr & Mrs T Bloody Lara Mix  Nabisco Fig Newtons - Whole Grain  Nabisco Fig Newtons - Fat Free  Wishbone Classic Caesars’ Dressing  Chicken of the Sea White Tuna, [place]    Answer: All    Additional Resources:    The Truth About Sugar - documentary on sugar free on Datadecision @ [email or web address]  SUGAR: The Bitter Truth by Dr. Stevo Jeronimo (pediatric endocrinologist) - Lecture on sugar for free on  Utube @ [email or web address]

## 2025-03-02 DIAGNOSIS — E66.812 CLASS 2 OBESITY DUE TO EXCESS CALORIES WITHOUT SERIOUS COMORBIDITY WITH BODY MASS INDEX (BMI) OF 36.0 TO 36.9 IN ADULT: ICD-10-CM

## 2025-03-02 DIAGNOSIS — E66.09 CLASS 2 OBESITY DUE TO EXCESS CALORIES WITHOUT SERIOUS COMORBIDITY WITH BODY MASS INDEX (BMI) OF 36.0 TO 36.9 IN ADULT: ICD-10-CM

## 2025-03-04 RX ORDER — METFORMIN HYDROCHLORIDE 500 MG/1
1000 TABLET, EXTENDED RELEASE ORAL 2 TIMES DAILY WITH MEALS
Qty: 360 TABLET | Refills: 1 | Status: SHIPPED | OUTPATIENT
Start: 2025-03-04

## 2025-03-04 NOTE — TELEPHONE ENCOUNTER
OV in chart from weight loss 12/26/24    Last OV relevant to medication: 8/16/24   Last refill date: 8/16/24     #/refills: 360/1   When pt was asked to return for OV:   Return in about 1 year (around 8/16/2025) for physical.    Upcoming appt/reason:   Future Appointments   Date Time Provider Department Center   6/11/2025  3:20 PM Richa Mercado APRN [facility]   11/26/2025  9:00 AM Richa Mercado APRN [facility]       Was pt informed of any over due labs: no active labs from our office past year  Active order fro us liver elasto     Lab Results   Component Value Date    GLU 96 08/22/2024    BUN 13 08/22/2024    CREATSERUM 1.02 08/22/2024    ANIONGAP 2 08/22/2024    CA 10.2 08/22/2024    OSMOCALC 288 08/22/2024    ALKPHO 69 11/25/2024    AST 27 11/25/2024    ALT 70 (H) 11/25/2024    BILT 0.9 11/25/2024    TP 7.2 11/25/2024    ALB 4.9 (H) 11/25/2024    GLOBULIN 2.2 08/22/2024     08/22/2024    K 4.0 08/22/2024     08/22/2024    CO2 31.0 08/22/2024       Lab Results   Component Value Date     08/12/2022    A1C 5.5 08/12/2022       No results found for: \"MALBP\", \"CREUR\", \"CREAURINE\", \"MIALBURINE\", \"MCRRATIOUR\", \"MALBCRECALC\", \"MICROALBUMIN\", \"CREAUR\", \"MALBCREACALC\"

## 2025-05-16 DIAGNOSIS — E66.09 CLASS 2 OBESITY DUE TO EXCESS CALORIES WITHOUT SERIOUS COMORBIDITY WITH BODY MASS INDEX (BMI) OF 36.0 TO 36.9 IN ADULT: ICD-10-CM

## 2025-05-16 DIAGNOSIS — E66.812 CLASS 2 OBESITY DUE TO EXCESS CALORIES WITHOUT SERIOUS COMORBIDITY WITH BODY MASS INDEX (BMI) OF 36.0 TO 36.9 IN ADULT: ICD-10-CM

## 2025-05-19 RX ORDER — METFORMIN HYDROCHLORIDE 500 MG/1
500 TABLET, EXTENDED RELEASE ORAL 2 TIMES DAILY WITH MEALS
Qty: 180 TABLET | Refills: 1 | OUTPATIENT
Start: 2025-05-19

## 2025-08-01 ENCOUNTER — TELEPHONE (OUTPATIENT)
Dept: INTERNAL MEDICINE CLINIC | Facility: CLINIC | Age: 20
End: 2025-08-01

## 2025-08-01 DIAGNOSIS — Z00.00 PHYSICAL EXAM, ANNUAL: ICD-10-CM

## 2025-08-01 DIAGNOSIS — E78.5 DYSLIPIDEMIA: Primary | ICD-10-CM

## 2025-08-15 ENCOUNTER — LAB ENCOUNTER (OUTPATIENT)
Dept: LAB | Age: 20
End: 2025-08-15
Attending: INTERNAL MEDICINE

## 2025-08-15 DIAGNOSIS — E66.812 CLASS 2 SEVERE OBESITY WITH SERIOUS COMORBIDITY AND BODY MASS INDEX (BMI) OF 36.0 TO 36.9 IN ADULT, UNSPECIFIED OBESITY TYPE (HCC): ICD-10-CM

## 2025-08-15 DIAGNOSIS — Z51.81 ENCOUNTER FOR THERAPEUTIC DRUG MONITORING: ICD-10-CM

## 2025-08-15 DIAGNOSIS — E66.01 CLASS 2 SEVERE OBESITY WITH SERIOUS COMORBIDITY AND BODY MASS INDEX (BMI) OF 36.0 TO 36.9 IN ADULT, UNSPECIFIED OBESITY TYPE (HCC): ICD-10-CM

## 2025-08-15 DIAGNOSIS — E88.819 INSULIN RESISTANCE: ICD-10-CM

## 2025-08-15 DIAGNOSIS — Z00.00 PHYSICAL EXAM, ANNUAL: ICD-10-CM

## 2025-08-15 DIAGNOSIS — E78.5 DYSLIPIDEMIA: ICD-10-CM

## 2025-08-15 DIAGNOSIS — K76.0 FATTY LIVER: ICD-10-CM

## 2025-08-15 LAB
ALBUMIN SERPL-MCNC: 5.3 G/DL (ref 3.2–4.8)
ALBUMIN/GLOB SERPL: 2.2 (ref 1–2)
ALP LIVER SERPL-CCNC: 64 U/L (ref 45–117)
ALT SERPL-CCNC: 132 U/L (ref 10–49)
ANION GAP SERPL CALC-SCNC: 13 MMOL/L (ref 0–18)
AST SERPL-CCNC: 40 U/L (ref ?–34)
BASOPHILS # BLD AUTO: 0.03 X10(3) UL (ref 0–0.2)
BASOPHILS NFR BLD AUTO: 0.4 %
BILIRUB SERPL-MCNC: 0.9 MG/DL (ref 0.3–1.2)
BUN BLD-MCNC: 11 MG/DL (ref 9–23)
CALCIUM BLD-MCNC: 10.6 MG/DL (ref 8.7–10.6)
CHLORIDE SERPL-SCNC: 103 MMOL/L (ref 98–112)
CHOLEST SERPL-MCNC: 193 MG/DL (ref ?–200)
CO2 SERPL-SCNC: 25 MMOL/L (ref 21–32)
CREAT BLD-MCNC: 1.07 MG/DL (ref 0.7–1.3)
EGFRCR SERPLBLD CKD-EPI 2021: 101 ML/MIN/1.73M2 (ref 60–?)
EOSINOPHIL # BLD AUTO: 0.15 X10(3) UL (ref 0–0.7)
EOSINOPHIL NFR BLD AUTO: 1.9 %
ERYTHROCYTE [DISTWIDTH] IN BLOOD BY AUTOMATED COUNT: 12.8 %
FASTING PATIENT LIPID ANSWER: YES
FASTING STATUS PATIENT QL REPORTED: YES
GLOBULIN PLAS-MCNC: 2.4 G/DL (ref 2–3.5)
GLUCOSE BLD-MCNC: 92 MG/DL (ref 70–99)
HCT VFR BLD AUTO: 49.3 % (ref 39–53)
HDLC SERPL-MCNC: 36 MG/DL (ref 40–59)
HGB BLD-MCNC: 16.7 G/DL (ref 13–17.5)
IMM GRANULOCYTES # BLD AUTO: 0.01 X10(3) UL (ref 0–1)
IMM GRANULOCYTES NFR BLD: 0.1 %
LDLC SERPL CALC-MCNC: 113 MG/DL (ref ?–100)
LYMPHOCYTES # BLD AUTO: 2.48 X10(3) UL (ref 1–4)
LYMPHOCYTES NFR BLD AUTO: 31.1 %
MCH RBC QN AUTO: 27.2 PG (ref 26–34)
MCHC RBC AUTO-ENTMCNC: 33.9 G/DL (ref 31–37)
MCV RBC AUTO: 80.3 FL (ref 80–100)
MONOCYTES # BLD AUTO: 0.52 X10(3) UL (ref 0.1–1)
MONOCYTES NFR BLD AUTO: 6.5 %
NEUTROPHILS # BLD AUTO: 4.79 X10 (3) UL (ref 1.5–7.7)
NEUTROPHILS # BLD AUTO: 4.79 X10(3) UL (ref 1.5–7.7)
NEUTROPHILS NFR BLD AUTO: 60 %
NONHDLC SERPL-MCNC: 157 MG/DL (ref ?–130)
OSMOLALITY SERPL CALC.SUM OF ELEC: 291 MOSM/KG (ref 275–295)
PLATELET # BLD AUTO: 257 10(3)UL (ref 150–450)
POTASSIUM SERPL-SCNC: 4 MMOL/L (ref 3.5–5.1)
PROT SERPL-MCNC: 7.7 G/DL (ref 5.7–8.2)
RBC # BLD AUTO: 6.14 X10(6)UL (ref 4.3–5.7)
SODIUM SERPL-SCNC: 141 MMOL/L (ref 136–145)
TRIGL SERPL-MCNC: 255 MG/DL (ref 30–149)
VIT B12 SERPL-MCNC: 868 PG/ML (ref 211–911)
VIT D+METAB SERPL-MCNC: 40.4 NG/ML (ref 30–100)
VLDLC SERPL CALC-MCNC: 44 MG/DL (ref 0–30)
WBC # BLD AUTO: 8 X10(3) UL (ref 4–11)

## 2025-08-15 PROCEDURE — 82306 VITAMIN D 25 HYDROXY: CPT

## 2025-08-15 PROCEDURE — 82607 VITAMIN B-12: CPT

## 2025-08-15 PROCEDURE — 85025 COMPLETE CBC W/AUTO DIFF WBC: CPT

## 2025-08-15 PROCEDURE — 80053 COMPREHEN METABOLIC PANEL: CPT

## 2025-08-15 PROCEDURE — 80061 LIPID PANEL: CPT

## 2025-08-15 PROCEDURE — 36415 COLL VENOUS BLD VENIPUNCTURE: CPT

## 2025-08-18 ENCOUNTER — OFFICE VISIT (OUTPATIENT)
Dept: INTERNAL MEDICINE CLINIC | Facility: CLINIC | Age: 20
End: 2025-08-18

## 2025-08-18 VITALS
HEART RATE: 77 BPM | TEMPERATURE: 98 F | BODY MASS INDEX: 37.64 KG/M2 | OXYGEN SATURATION: 99 % | RESPIRATION RATE: 20 BRPM | HEIGHT: 73 IN | DIASTOLIC BLOOD PRESSURE: 80 MMHG | WEIGHT: 284 LBS | SYSTOLIC BLOOD PRESSURE: 130 MMHG

## 2025-08-18 DIAGNOSIS — Z00.00 PHYSICAL EXAM, ANNUAL: Primary | ICD-10-CM

## 2025-08-18 DIAGNOSIS — E66.01 CLASS 2 SEVERE OBESITY DUE TO EXCESS CALORIES WITH SERIOUS COMORBIDITY AND BODY MASS INDEX (BMI) OF 36.0 TO 36.9 IN ADULT (HCC): ICD-10-CM

## 2025-08-18 DIAGNOSIS — O98.419 MATERNAL HBSAG (HEPATITIS B SURFACE ANTIGEN) CARRIER (HCC): ICD-10-CM

## 2025-08-18 DIAGNOSIS — K76.0 FATTY LIVER: ICD-10-CM

## 2025-08-18 DIAGNOSIS — E78.5 DYSLIPIDEMIA: ICD-10-CM

## 2025-08-18 DIAGNOSIS — R74.01 TRANSAMINITIS: ICD-10-CM

## 2025-08-18 DIAGNOSIS — E66.812 CLASS 2 SEVERE OBESITY DUE TO EXCESS CALORIES WITH SERIOUS COMORBIDITY AND BODY MASS INDEX (BMI) OF 36.0 TO 36.9 IN ADULT (HCC): ICD-10-CM

## 2025-08-18 DIAGNOSIS — B18.1 MATERNAL HBSAG (HEPATITIS B SURFACE ANTIGEN) CARRIER (HCC): ICD-10-CM

## 2025-08-18 RX ORDER — METFORMIN HYDROCHLORIDE 500 MG/1
1000 TABLET, EXTENDED RELEASE ORAL 2 TIMES DAILY WITH MEALS
Qty: 360 TABLET | Refills: 1 | Status: SHIPPED | OUTPATIENT
Start: 2025-08-18

## (undated) NOTE — Clinical Note
Thank you for referring Dexter to the [facility]. Consult was completed today via person.  I have ordered labs, referred for a nutrition consultation with our dietician and fitness center (when he is local).  I counseled on the importance of lifestyle intervention in adjunct with medication and advised to maintain Metformin ER consistently with option to switch to Wegovy or Zepbound if cost affordable. He will follow-up in about 6 months when back home from school.